# Patient Record
Sex: FEMALE | Race: BLACK OR AFRICAN AMERICAN | NOT HISPANIC OR LATINO | Employment: FULL TIME | URBAN - METROPOLITAN AREA
[De-identification: names, ages, dates, MRNs, and addresses within clinical notes are randomized per-mention and may not be internally consistent; named-entity substitution may affect disease eponyms.]

---

## 2017-01-26 RX ORDER — LISINOPRIL AND HYDROCHLOROTHIAZIDE 20; 25 MG/1; MG/1
TABLET ORAL
Qty: 30 TABLET | Refills: 0 | OUTPATIENT
Start: 2017-01-26

## 2017-03-15 RX ORDER — LISINOPRIL AND HYDROCHLOROTHIAZIDE 20; 25 MG/1; MG/1
TABLET ORAL
Qty: 30 TABLET | Refills: 0 | OUTPATIENT
Start: 2017-03-15

## 2017-03-29 RX ORDER — LISINOPRIL AND HYDROCHLOROTHIAZIDE 20; 25 MG/1; MG/1
TABLET ORAL
Qty: 30 TABLET | Refills: 0 | OUTPATIENT
Start: 2017-03-29

## 2017-05-02 ENCOUNTER — OFFICE VISIT (OUTPATIENT)
Dept: INTERNAL MEDICINE | Facility: CLINIC | Age: 52
End: 2017-05-02
Payer: COMMERCIAL

## 2017-05-02 VITALS
SYSTOLIC BLOOD PRESSURE: 154 MMHG | HEART RATE: 100 BPM | DIASTOLIC BLOOD PRESSURE: 120 MMHG | HEIGHT: 64 IN | TEMPERATURE: 97 F | BODY MASS INDEX: 32.7 KG/M2 | WEIGHT: 191.56 LBS

## 2017-05-02 DIAGNOSIS — E11.9 CONTROLLED TYPE 2 DIABETES MELLITUS WITHOUT COMPLICATION, WITHOUT LONG-TERM CURRENT USE OF INSULIN: ICD-10-CM

## 2017-05-02 DIAGNOSIS — Z12.31 SCREENING MAMMOGRAM FOR HIGH-RISK PATIENT: ICD-10-CM

## 2017-05-02 DIAGNOSIS — Z12.11 ENCOUNTER FOR SCREENING COLONOSCOPY: ICD-10-CM

## 2017-05-02 DIAGNOSIS — I10 ESSENTIAL HYPERTENSION: Primary | ICD-10-CM

## 2017-05-02 DIAGNOSIS — Z12.4 PAP SMEAR FOR CERVICAL CANCER SCREENING: ICD-10-CM

## 2017-05-02 PROCEDURE — 3080F DIAST BP >= 90 MM HG: CPT | Mod: S$GLB,,, | Performed by: FAMILY MEDICINE

## 2017-05-02 PROCEDURE — 3046F HEMOGLOBIN A1C LEVEL >9.0%: CPT | Mod: 8P,S$GLB,, | Performed by: FAMILY MEDICINE

## 2017-05-02 PROCEDURE — 99999 PR PBB SHADOW E&M-EST. PATIENT-LVL III: CPT | Mod: PBBFAC,,, | Performed by: FAMILY MEDICINE

## 2017-05-02 PROCEDURE — 4010F ACE/ARB THERAPY RXD/TAKEN: CPT | Mod: S$GLB,,, | Performed by: FAMILY MEDICINE

## 2017-05-02 PROCEDURE — 1160F RVW MEDS BY RX/DR IN RCRD: CPT | Mod: S$GLB,,, | Performed by: FAMILY MEDICINE

## 2017-05-02 PROCEDURE — 3077F SYST BP >= 140 MM HG: CPT | Mod: S$GLB,,, | Performed by: FAMILY MEDICINE

## 2017-05-02 PROCEDURE — 99214 OFFICE O/P EST MOD 30 MIN: CPT | Mod: S$GLB,,, | Performed by: FAMILY MEDICINE

## 2017-05-02 RX ORDER — LISINOPRIL AND HYDROCHLOROTHIAZIDE 20; 25 MG/1; MG/1
1 TABLET ORAL DAILY
Qty: 90 TABLET | Refills: 1 | Status: SHIPPED | OUTPATIENT
Start: 2017-05-02 | End: 2018-02-07 | Stop reason: SDUPTHER

## 2017-05-02 RX ORDER — METFORMIN HYDROCHLORIDE 500 MG/1
500 TABLET ORAL 2 TIMES DAILY WITH MEALS
Qty: 60 TABLET | Refills: 2 | Status: SHIPPED | OUTPATIENT
Start: 2017-05-02 | End: 2017-08-29 | Stop reason: SDUPTHER

## 2017-05-02 RX ORDER — NIFEDIPINE 90 MG/1
90 TABLET, EXTENDED RELEASE ORAL DAILY
Qty: 90 TABLET | Refills: 3 | Status: SHIPPED | OUTPATIENT
Start: 2017-05-02 | End: 2018-02-07 | Stop reason: SDUPTHER

## 2017-05-02 NOTE — MR AVS SNAPSHOT
[facility]  [address]  [place] [address]  Phone: [phone]  Fax: [phone]                  Kaitlyn Hairston   2017 1:00 PM   Office Visit    Description:  Female : 1965   Provider:  David Nunes MD   Department:  [facility] - Internal Medicine           Reason for Visit     Annual Exam     Medication Refill           Diagnoses this Visit        Comments    Essential hypertension    -  Primary Will stay 20/25 mg of lisinopril and procardia    Encounter for screening colonoscopy     Will colonoscopy.    Controlled type 2 diabetes mellitus without complication, without long-term current use of insulin     Will continue with the metformin 500 mg and discussed possible diet    Pap smear for cervical cancer screening     Will set pt up with GYN    Screening mammogram for high-risk patient     Will do mammogram           To Do List           Future Appointments        Provider Department Dept Phone    2017 1:00 PM Gladis Hoffmann NP [facility] - OB/ [phone]    2017 2:30 PM [facility] MAMM-SCR [facility] [phone]    2017 1:00 PM Luke Colon Jr., PAMargaritaC [facility] - Diabetes Management [phone]      Goals (5 Years of Data)     None      Follow-Up and Disposition     Return in about 6 months (around 2017).    Follow-up and Disposition History       These Medications        Disp Refills Start End    lisinopril-hydrochlorothiazide (PRINZIDE,ZESTORETIC) 20-25 mg Tab 90 tablet 1 2017    Take 1 tablet by mouth once daily. - Oral    Pharmacy: [facility] [address] [place], [place] [address] - [address] Ph #: [phone]       nifedipine (PROCARDIA-XL) 90 MG (OSM) 24 hr tablet 90 tablet 3 2017     Take 1 tablet (90 mg total) by mouth once daily. - Oral    Ph[facility] [facility] [address] [address] Ph #: [phone]       metformin (GLUCOPHAGE) 500 MG tablet 60 tablet 2 2017  5/2/2018    Take 1 tablet (500 mg total) by mouth 2 (two) times daily with meals. - Oral    Pharmacy: [facility] [address] [place] [address] [address] [place] [address] #: [phone]         [facility]     [facility] On Call Nurse Care Line - 24/7 Assistance  Unless otherwise directed by your provider, please contact [facility], our nurse care line that is available for 24/7 assistance.     Registered nurses in the [facility] provide: appointment scheduling, clinical advisement, health education, and other advisory services.  Call: [phone] (toll free)               Medications           Message regarding Medications     Verify the changes and/or additions to your medication regime listed below are the same as discussed with your clinician today.  If any of these changes or additions are incorrect, please notify your healthcare provider.        CHANGE how you are taking these medications     Start Taking Instead of    nifedipine (PROCARDIA-XL) 90 MG (OSM) 24 hr tablet nifedipine (PROCARDIA-XL) 90 MG (OSM) TR24    Dosage:  Take 1 tablet (90 mg total) by mouth once daily. Dosage:  Take 1 tablet (90 mg total) by mouth once daily.    Reason for Change:  Reorder            Verify that the below list of medications is an accurate representation of the medications you are currently taking.  If none reported, the list may be blank. If incorrect, please contact your healthcare provider. Carry this list with you in case of emergency.           Current Medications     lisinopril-hydrochlorothiazide (PRINZIDE,ZESTORETIC) 20-25 mg Tab Take 1 tablet by mouth once daily.    metformin (GLUCOPHAGE) 500 MG tablet Take 1 tablet (500 mg total) by mouth 2 (two) times daily with meals.    nifedipine (PROCARDIA-XL) 90 MG (OSM) 24 hr tablet Take 1 tablet (90 mg total) by mouth once daily.           Clinical Reference Information           Your Vitals Were     BP Pulse Temp Height Weight Last Period    154/120 (BP  "Location: Right arm, Patient Position: Sitting, BP Method: Manual) 100 97 °F (36.1 °C) (Tympanic) 5' 4" (1.626 m) 86.9 kg (191 lb 9.3 oz) 05/02/2017    BMI                32.88 kg/m2          Blood Pressure          Most Recent Value    BP  (!)  154/120      Allergies as of 5/2/2017     No Known Allergies      Immunizations Administered on Date of Encounter - 5/2/2017     None      Orders Placed During Today's Visit      Normal Orders This Visit    Ambulatory consult to Gynecology     Ambulatory Referral to Diabetes Education     Case request GI: COLONOSCOPY     Future Labs/Procedures Expected by Expires    Mammo Digital Screening Bilateral With CAD  5/2/2017 7/2/2018      [facility] Sign-Up     Activating your [facility] account is as easy as 1-2-3!     1) Visit [email or web address], select Sign Up Now, enter this activation code and your date of birth, then select Next.  [email or web address]  Expires: 6/16/2017  2:00 PM      2) Create a username and password to use when you visit [facility] in the future and select a security question in case you lose your password and select Next.    3) Enter your e-mail address and click Sign Up!    Additional Information  If you have questions, please e-mail [email or web address] or call [phone] to talk to our [facility] staff. Remember, [facility] is NOT to be used for urgent needs. For medical emergencies, dial 911.         Language Assistance Services     ATTENTION: Language assistance services are available, free of charge. Please call [phone].      ATENCIÓN: Si habla español, tiene a cohen disposición servicios gratuitos de asistencia lingüística. Llame al [phone].     CHÚ Ý: N?u b?n nói Ti?ng Vi?t, có các d?ch v? h? tr? ngôn ng? mi?n phí dành cho b?n. G?i s? [phone].         [facility] complies with applicable Federal civil rights laws and does not discriminate on the basis of race, color, national origin, age, disability, or sex.        "

## 2017-05-02 NOTE — PROGRESS NOTES
Subjective:       Patient ID: Kaitlyn Hairston is a 51 y.o. female.    Chief Complaint: Annual Exam and Medication Refill    Medication Refill   This is a chronic problem. The current episode started more than 1 year ago. The problem occurs constantly. Pertinent negatives include no arthralgias, change in bowel habit, chest pain, chills, headaches, joint swelling, nausea, neck pain, urinary symptoms or visual change. Nothing aggravates the symptoms. She has tried nothing for the symptoms. The treatment provided moderate relief.     Review of Systems   Constitutional: Negative.  Negative for chills.   Respiratory: Negative.    Cardiovascular: Negative for chest pain.   Gastrointestinal: Negative for change in bowel habit and nausea.   Genitourinary: Negative.    Musculoskeletal: Negative.  Negative for arthralgias, joint swelling and neck pain.   Neurological: Negative.  Negative for headaches.   Hematological: Negative.        Objective:      Physical Exam   Constitutional: She is oriented to person, place, and time. She appears well-developed and well-nourished.   Cardiovascular: Normal rate and regular rhythm.    Pulmonary/Chest: Effort normal and breath sounds normal.   Abdominal: Soft. Bowel sounds are normal.   Neurological: She is alert and oriented to person, place, and time.   Skin: Skin is warm and dry.       Assessment:       1. Essential hypertension    2. Encounter for screening colonoscopy    3. Controlled type 2 diabetes mellitus without complication, without long-term current use of insulin    4. Pap smear for cervical cancer screening    5. Screening mammogram for high-risk patient        Plan:       Essential hypertension  Comments:  Will stay 20/25 mg of lisinopril and procardia    Encounter for screening colonoscopy  Comments:  Will colonoscopy.  Orders:  -     Case request GI: COLONOSCOPY    Controlled type 2 diabetes mellitus without complication, without long-term current use of  insulin  Comments:  Will continue with the metformin 500 mg and discussed possible diet  Orders:  -     metformin (GLUCOPHAGE) 500 MG tablet; Take 1 tablet (500 mg total) by mouth 2 (two) times daily with meals.  Dispense: 60 tablet; Refill: 2  -     Ambulatory Referral to Diabetes Education    Pap smear for cervical cancer screening  Comments:  Will set pt up with GYN  Orders:  -     Ambulatory consult to Gynecology    Screening mammogram for high-risk patient  Comments:  Will do mammogram  Orders:  -     Mammo Digital Screening Bilateral With CAD; Future; Expected date: 5/2/17    Other orders  -     lisinopril-hydrochlorothiazide (PRINZIDE,ZESTORETIC) 20-25 mg Tab; Take 1 tablet by mouth once daily.  Dispense: 90 tablet; Refill: 1  -     nifedipine (PROCARDIA-XL) 90 MG (OSM) 24 hr tablet; Take 1 tablet (90 mg total) by mouth once daily.  Dispense: 90 tablet; Refill: 3

## 2017-05-05 DIAGNOSIS — E11.9 TYPE 2 DIABETES MELLITUS WITHOUT COMPLICATION: ICD-10-CM

## 2017-08-29 ENCOUNTER — OFFICE VISIT (OUTPATIENT)
Dept: INTERNAL MEDICINE | Facility: CLINIC | Age: 52
End: 2017-08-29
Payer: COMMERCIAL

## 2017-08-29 VITALS
WEIGHT: 194.25 LBS | DIASTOLIC BLOOD PRESSURE: 86 MMHG | HEIGHT: 64 IN | BODY MASS INDEX: 33.16 KG/M2 | TEMPERATURE: 96 F | HEART RATE: 92 BPM | SYSTOLIC BLOOD PRESSURE: 120 MMHG

## 2017-08-29 DIAGNOSIS — I10 ESSENTIAL HYPERTENSION: ICD-10-CM

## 2017-08-29 DIAGNOSIS — E11.9 CONTROLLED TYPE 2 DIABETES MELLITUS WITHOUT COMPLICATION, WITHOUT LONG-TERM CURRENT USE OF INSULIN: ICD-10-CM

## 2017-08-29 DIAGNOSIS — Z12.11 ENCOUNTER FOR SCREENING COLONOSCOPY: Primary | ICD-10-CM

## 2017-08-29 DIAGNOSIS — Z12.4 ENCOUNTER FOR PAPANICOLAOU SMEAR FOR CERVICAL CANCER SCREENING: ICD-10-CM

## 2017-08-29 DIAGNOSIS — Z12.31 ENCOUNTER FOR MAMMOGRAM TO ESTABLISH BASELINE MAMMOGRAM: ICD-10-CM

## 2017-08-29 PROCEDURE — 99213 OFFICE O/P EST LOW 20 MIN: CPT | Mod: S$GLB,,, | Performed by: FAMILY MEDICINE

## 2017-08-29 PROCEDURE — 3079F DIAST BP 80-89 MM HG: CPT | Mod: S$GLB,,, | Performed by: FAMILY MEDICINE

## 2017-08-29 PROCEDURE — 3074F SYST BP LT 130 MM HG: CPT | Mod: S$GLB,,, | Performed by: FAMILY MEDICINE

## 2017-08-29 PROCEDURE — 3008F BODY MASS INDEX DOCD: CPT | Mod: S$GLB,,, | Performed by: FAMILY MEDICINE

## 2017-08-29 PROCEDURE — 4010F ACE/ARB THERAPY RXD/TAKEN: CPT | Mod: S$GLB,,, | Performed by: FAMILY MEDICINE

## 2017-08-29 PROCEDURE — 99999 PR PBB SHADOW E&M-EST. PATIENT-LVL IV: CPT | Mod: PBBFAC,,, | Performed by: FAMILY MEDICINE

## 2017-08-29 RX ORDER — METFORMIN HYDROCHLORIDE 500 MG/1
500 TABLET ORAL 2 TIMES DAILY WITH MEALS
Qty: 60 TABLET | Refills: 5 | Status: SHIPPED | OUTPATIENT
Start: 2017-08-29 | End: 2018-02-07 | Stop reason: SDUPTHER

## 2017-08-29 NOTE — PROGRESS NOTES
Subjective:       Patient ID: Kaitlyn Hairston is a 52 y.o. female.    Chief Complaint: Follow-up    F/U:      Pt I sa 52 year old here for follow-up with HTN. Pt BP is better controlled today. She does have DM that is well controlled. Discussed with pt mammogram, colonoscopy and PAP.      Review of Systems   Constitutional: Negative.    Respiratory: Negative.    Cardiovascular: Negative.    Genitourinary: Negative.    Neurological: Negative.    Hematological: Negative.    Psychiatric/Behavioral: Negative.        Objective:      Physical Exam   Constitutional: She is oriented to person, place, and time. She appears well-developed and well-nourished.   Eyes: EOM are normal. Pupils are equal, round, and reactive to light.   Cardiovascular: Normal rate and regular rhythm.    Pulmonary/Chest: Effort normal and breath sounds normal. She has no wheezes. She exhibits no tenderness.   Abdominal: Soft. Bowel sounds are normal.   Neurological: She is alert and oriented to person, place, and time.   Psychiatric: She has a normal mood and affect.       Assessment:       1. Encounter for screening colonoscopy    2. Controlled type 2 diabetes mellitus without complication, without long-term current use of insulin    3. Encounter for mammogram to establish baseline mammogram    4. Encounter for Papanicolaou smear for cervical cancer screening    5. Essential hypertension        Plan:       Encounter for screening colonoscopy  -     Case request GI: COLONOSCOPY    Controlled type 2 diabetes mellitus without complication, without long-term current use of insulin  Comments:  Will continue with the metformin 500 mg and discussed possible diet  Orders:  -     metformin (GLUCOPHAGE) 500 MG tablet; Take 1 tablet (500 mg total) by mouth 2 (two) times daily with meals.  Dispense: 60 tablet; Refill: 5    Encounter for mammogram to establish baseline mammogram  -     Mammo Digital Screening Bilateral With CAD; Future; Expected date:  08/29/2017    Encounter for Papanicolaou smear for cervical cancer screening  -     Ambulatory consult to Gynecology    Essential hypertension

## 2017-09-12 ENCOUNTER — HOSPITAL ENCOUNTER (OUTPATIENT)
Dept: RADIOLOGY | Facility: HOSPITAL | Age: 52
Discharge: HOME | End: 2017-09-12
Attending: FAMILY MEDICINE
Payer: COMMERCIAL

## 2017-09-12 ENCOUNTER — OFFICE VISIT (OUTPATIENT)
Dept: OBSTETRICS AND GYNECOLOGY | Facility: CLINIC | Age: 52
End: 2017-09-12
Payer: COMMERCIAL

## 2017-09-12 VITALS
HEIGHT: 64 IN | WEIGHT: 197.06 LBS | SYSTOLIC BLOOD PRESSURE: 168 MMHG | DIASTOLIC BLOOD PRESSURE: 109 MMHG | BODY MASS INDEX: 33.64 KG/M2

## 2017-09-12 DIAGNOSIS — Z01.419 WELL WOMAN EXAM WITH ROUTINE GYNECOLOGICAL EXAM: Primary | ICD-10-CM

## 2017-09-12 DIAGNOSIS — I10 ESSENTIAL HYPERTENSION: ICD-10-CM

## 2017-09-12 DIAGNOSIS — Z12.31 ENCOUNTER FOR MAMMOGRAM TO ESTABLISH BASELINE MAMMOGRAM: ICD-10-CM

## 2017-09-12 PROCEDURE — 88175 CYTOPATH C/V AUTO FLUID REDO: CPT

## 2017-09-12 PROCEDURE — 77067 SCR MAMMO BI INCL CAD: CPT | Mod: TC

## 2017-09-12 PROCEDURE — 77067 SCR MAMMO BI INCL CAD: CPT | Mod: 26,,, | Performed by: RADIOLOGY

## 2017-09-12 PROCEDURE — 99999 PR PBB SHADOW E&M-EST. PATIENT-LVL III: CPT | Mod: PBBFAC,,, | Performed by: OBSTETRICS & GYNECOLOGY

## 2017-09-12 PROCEDURE — 99386 PREV VISIT NEW AGE 40-64: CPT | Mod: S$GLB,,, | Performed by: OBSTETRICS & GYNECOLOGY

## 2017-09-12 NOTE — LETTER
September 12, 2017      David Nunes MD  [address] [place] [address]           [facility]  [address] [place] [address]  Phone: [phone]  Fax: [phone]          Patient: Kaitlyn Hairston   MR Number: [id number]   YOB: 1965   Date of Visit: 9/12/2017       Dear Dr. David Nunes:    Thank you for referring Kaitlyn Hairston to me for evaluation. Attached you will find relevant portions of my assessment and plan of care.    If you have questions, please do not hesitate to call me. I look forward to following Kaitlyn Hairston along with you.    Sincerely,    Anoop Ramirez MD    Enclosure  CC:  No Recipients    If you would like to receive this communication electronically, please contact [email or web address] or [phone] to request more information on [employer] access.    For providers and/or their staff who would like to refer a patient to [facility], please contact us through our one-stop-shop provider referral line, [facility], at [phone].    If you feel you have received this communication in error or would no longer like to receive these types of communications, please e-mail [email or web address]

## 2017-09-12 NOTE — PROGRESS NOTES
Subjective:       Patient ID: Kaitlyn Hairston is a 52 y.o. female.    Chief Complaint:  Well Woman      History of Present Illness  HPI  Annual Exam-Premenopausal  Patient presents for annual exam. The patient has no complaints today. The patient is sexually active. GYN screening history: last pap: approximate date  and was normal and last mammogram: approximate date  and was normal. The patient wears seatbelts: yes. The patient participates in regular exercise: no. Has the patient ever been transfused or tattooed?: no. The patient reports that there is not domestic violence in her life.  Menses remain regular.  Denies excessive cramping or bleeding.  Pt admits to forgetting to take BP pill this morning.  Forgets to take meds from time to time.  Denies headache, CP.        GYN & OB HistoryPatient's last menstrual period was 2017 (exact date).   Date of Last Pap: No result found    OB History    Para Term  AB Living   3 3 3     3   SAB TAB Ectopic Multiple Live Births                  # Outcome Date GA Lbr Isac/2nd Weight Sex Delivery Anes PTL Lv   3 Term            2 Term            1 Term                   Review of Systems  Review of Systems   Constitutional: Negative for activity change, appetite change, fatigue, fever and unexpected weight change.   Respiratory: Negative for shortness of breath.    Cardiovascular: Negative for chest pain, palpitations and leg swelling.   Gastrointestinal: Negative for abdominal pain, constipation, diarrhea, nausea and vomiting.   Genitourinary: Negative for dyspareunia, dysuria, frequency, genital sores, hematuria, menorrhagia, menstrual problem, pelvic pain, vaginal bleeding, vaginal discharge, vaginal pain, dysmenorrhea and vaginal odor.   Musculoskeletal: Negative for back pain.   Neurological: Negative for syncope and headaches.   Breast: Negative for breast mass, breast pain, nipple discharge and skin changes          Objective:    Physical  Exam:   Constitutional: She is oriented to person, place, and time. She appears well-developed and well-nourished. No distress.    HENT:   Head: Normocephalic and atraumatic.    Eyes: EOM are normal. Pupils are equal, round, and reactive to light.    Neck: Normal range of motion. Neck supple.    Cardiovascular: Normal rate, regular rhythm and normal heart sounds.     Pulmonary/Chest: Effort normal and breath sounds normal. Right breast exhibits no inverted nipple, no mass, no nipple discharge, no skin change, no tenderness, no bleeding and no swelling. Left breast exhibits no inverted nipple, no mass, no nipple discharge, no skin change, no tenderness, no bleeding and no swelling. Breasts are symmetrical.        Abdominal: Soft. Bowel sounds are normal. She exhibits no distension. There is no tenderness.     Genitourinary: Vagina normal and uterus normal. Pelvic exam was performed with patient supine. There is no rash, tenderness, lesion or injury on the right labia. There is no rash, tenderness, lesion or injury on the left labia. Uterus is not deviated, not enlarged and not tender. Cervix is normal. Right adnexum displays no mass, no tenderness and no fullness. Left adnexum displays no mass, no tenderness and no fullness. No erythema, tenderness or bleeding in the vagina. No foreign body in the vagina. No signs of injury around the vagina. No vaginal discharge found. Cervix exhibits no motion tenderness, no discharge and no friability.           Musculoskeletal: Normal range of motion and moves all extremeties. She exhibits no edema or tenderness.       Neurological: She is alert and oriented to person, place, and time.    Skin: Skin is warm and dry.    Psychiatric: She has a normal mood and affect. Her behavior is normal. Thought content normal.        Vitals:    09/12/17 1610   BP: (!) 168/109          Assessment:        1. Well woman exam with routine gynecological exam    2. Essential hypertension              Plan:      Well woman exam with routine gynecological exam  -     Liquid-based pap smear, screening  -     Pt was counseled on cervical/vaginal screening guidelines and recommendations.  Last pap NILM on 2014.  If today's pap smear result is negative, next pap smear will be due in 2020.  -     Pt was advised on current breast cancer screening recommendations.  Pt desires to proceed with breast exam today and screening MMG.  -     Follow up with PCP for routine health maintenance needs.    Essential hypertension  -      BP today elevated and pt admits to forgetting to take medication today.  Pt was counseled on importance of taking medication as instructed and on risks of non-compliance.  Pt voiced understanding and vows to do better.         Return in about 1 year (around 9/12/2018).

## 2017-09-18 ENCOUNTER — TELEPHONE (OUTPATIENT)
Dept: OBSTETRICS AND GYNECOLOGY | Facility: CLINIC | Age: 52
End: 2017-09-18

## 2017-09-18 NOTE — TELEPHONE ENCOUNTER
----- Message from Anoop Ramirez MD sent at 9/18/2017  8:08 AM CDT -----  Please inform patient that her pap smear result is normal.

## 2018-02-07 ENCOUNTER — OFFICE VISIT (OUTPATIENT)
Dept: INTERNAL MEDICINE | Facility: CLINIC | Age: 53
End: 2018-02-07
Payer: COMMERCIAL

## 2018-02-07 VITALS
TEMPERATURE: 98 F | BODY MASS INDEX: 33.57 KG/M2 | HEIGHT: 64 IN | SYSTOLIC BLOOD PRESSURE: 134 MMHG | WEIGHT: 196.63 LBS | DIASTOLIC BLOOD PRESSURE: 88 MMHG | HEART RATE: 101 BPM

## 2018-02-07 DIAGNOSIS — B35.3 TINEA PEDIS OF RIGHT FOOT: ICD-10-CM

## 2018-02-07 DIAGNOSIS — E11.9 CONTROLLED TYPE 2 DIABETES MELLITUS WITHOUT COMPLICATION, WITHOUT LONG-TERM CURRENT USE OF INSULIN: ICD-10-CM

## 2018-02-07 DIAGNOSIS — Z12.11 ENCOUNTER FOR SCREENING COLONOSCOPY: ICD-10-CM

## 2018-02-07 DIAGNOSIS — I10 ESSENTIAL HYPERTENSION: Primary | ICD-10-CM

## 2018-02-07 PROBLEM — Z12.31 ENCOUNTER FOR MAMMOGRAM TO ESTABLISH BASELINE MAMMOGRAM: Status: RESOLVED | Noted: 2017-08-29 | Resolved: 2018-02-07

## 2018-02-07 PROBLEM — Z12.4 ENCOUNTER FOR PAPANICOLAOU SMEAR FOR CERVICAL CANCER SCREENING: Status: RESOLVED | Noted: 2017-08-29 | Resolved: 2018-02-07

## 2018-02-07 PROCEDURE — 99214 OFFICE O/P EST MOD 30 MIN: CPT | Mod: S$GLB,,, | Performed by: NURSE PRACTITIONER

## 2018-02-07 PROCEDURE — 99999 PR PBB SHADOW E&M-EST. PATIENT-LVL III: CPT | Mod: PBBFAC,,, | Performed by: NURSE PRACTITIONER

## 2018-02-07 PROCEDURE — 3008F BODY MASS INDEX DOCD: CPT | Mod: S$GLB,,, | Performed by: NURSE PRACTITIONER

## 2018-02-07 RX ORDER — LISINOPRIL AND HYDROCHLOROTHIAZIDE 20; 25 MG/1; MG/1
1 TABLET ORAL DAILY
Qty: 90 TABLET | Refills: 1 | Status: SHIPPED | OUTPATIENT
Start: 2018-02-07 | End: 2018-11-24 | Stop reason: SDUPTHER

## 2018-02-07 RX ORDER — METFORMIN HYDROCHLORIDE 500 MG/1
500 TABLET ORAL 2 TIMES DAILY WITH MEALS
Qty: 60 TABLET | Refills: 5 | Status: SHIPPED | OUTPATIENT
Start: 2018-02-07 | End: 2019-02-11

## 2018-02-07 RX ORDER — NYSTATIN 100000 U/G
OINTMENT TOPICAL 2 TIMES DAILY
Qty: 15 G | Refills: 0 | Status: SHIPPED | OUTPATIENT
Start: 2018-02-07 | End: 2018-05-09 | Stop reason: ALTCHOICE

## 2018-02-07 RX ORDER — NIFEDIPINE 90 MG/1
90 TABLET, EXTENDED RELEASE ORAL DAILY
Qty: 90 TABLET | Refills: 3 | Status: SHIPPED | OUTPATIENT
Start: 2018-02-07 | End: 2019-02-11 | Stop reason: SDUPTHER

## 2018-02-07 NOTE — PROGRESS NOTES
"Subjective:       Patient ID: Kaitlyn Hairston is a 52 y.o. female.    Chief Complaint: Medication Refill    HTN- pt is requesting refill on Prinzide and Procardia. States that BP runs well at home. No cp, sob, headache, dizziness, blurred vision, syncope, or leg swelling. Admits to not always eating low sodium. Will schedule eye exam and [facility] this month.    DM- admits to not checking regularly. Does not always take metformin as ordered. She does not think she really needs it. Denies numbness/tinging in extremities, polyuria -dypsia, or -phagia    Diet - difficulty with diet compliance. Reports sweets as a "weakness"    Darkened skin and itching between toes reported. Has been washing with peroxide.    Reports very active at work - walks up and down several flights of stairs all day. No exercise outside of work  No recent hospitalization       Review of Systems   Constitutional: Negative for activity change, appetite change, chills, diaphoresis, fatigue, fever and unexpected weight change.   HENT: Negative for congestion, ear pain, postnasal drip, rhinorrhea, sinus pain, sinus pressure, sneezing, sore throat, tinnitus, trouble swallowing and voice change.    Eyes: Negative for photophobia, pain and visual disturbance.   Respiratory: Negative for cough, chest tightness, shortness of breath and wheezing.    Cardiovascular: Negative for chest pain, palpitations and leg swelling.   Gastrointestinal: Negative for abdominal distention, abdominal pain, blood in stool, constipation, diarrhea, nausea and vomiting.   Endocrine: Negative for polydipsia, polyphagia and polyuria.   Genitourinary: Negative for dysuria and frequency.   Musculoskeletal: Negative for arthralgias, back pain, joint swelling, neck pain and neck stiffness.   Skin:        Darkened itchy skin b/t toes    Neurological: Negative for dizziness, tremors, seizures, syncope, facial asymmetry, speech difficulty, weakness, light-headedness, numbness and " headaches.   Psychiatric/Behavioral: Negative for confusion and sleep disturbance.       Objective:      Physical Exam   Constitutional: She is oriented to person, place, and time.   Neck: Normal range of motion. Neck supple.   Cardiovascular: Normal rate, regular rhythm and normal heart sounds.    Pulmonary/Chest: Effort normal and breath sounds normal.   Abdominal: Soft. Bowel sounds are normal.   Musculoskeletal: Normal range of motion.   Neurological: She is alert and oriented to person, place, and time.   Skin: Skin is warm and dry.   Right foot interdigital darkening and mild scaling noted. No sign of secondary bacterial infection noted.   Psychiatric: She has a normal mood and affect.       Assessment:       1. Essential hypertension    2. Controlled type 2 diabetes mellitus without complication, without long-term current use of insulin    3. Tinea pedis of right foot    4. Encounter for screening colonoscopy        Plan:   Essential hypertension  -     NIFEdipine (PROCARDIA-XL) 90 MG (OSM) 24 hr tablet; Take 1 tablet (90 mg total) by mouth once daily.  Dispense: 90 tablet; Refill: 3  -     lisinopril-hydrochlorothiazide (PRINZIDE,ZESTORETIC) 20-25 mg Tab; Take 1 tablet by mouth once daily.  Dispense: 90 tablet; Refill: 1  -     Lipid panel; Future; Expected date: 06/07/2018    Controlled type 2 diabetes mellitus without complication, without long-term current use of insulin  -     metFORMIN (GLUCOPHAGE) 500 MG tablet; Take 1 tablet (500 mg total) by mouth 2 (two) times daily with meals.  Dispense: 60 tablet; Refill: 5  -     Hemoglobin A1c; Future; Expected date: 02/07/2018  -     Lipid panel; Future; Expected date: 06/07/2018  -     Microalbumin/creatinine urine ratio; Future; Expected date: 02/07/2018    Tinea pedis of right foot  -     nystatin (MYCOSTATIN) ointment; Apply topically 2 (two) times daily.  Dispense: 15 g; Refill: 0    Encounter for screening colonoscopy  -     Case request GI:  COLONOSCOPY      Labs/urine as above  Refill meds  colonscopy screening  RTC 6 months

## 2018-02-13 ENCOUNTER — LAB VISIT (OUTPATIENT)
Dept: LAB | Facility: HOSPITAL | Age: 53
End: 2018-02-13
Attending: NURSE PRACTITIONER
Payer: COMMERCIAL

## 2018-02-13 DIAGNOSIS — E11.9 CONTROLLED TYPE 2 DIABETES MELLITUS WITHOUT COMPLICATION, WITHOUT LONG-TERM CURRENT USE OF INSULIN: ICD-10-CM

## 2018-02-13 DIAGNOSIS — I10 ESSENTIAL HYPERTENSION: ICD-10-CM

## 2018-02-13 LAB
CHOLEST SERPL-MCNC: 177 MG/DL
CHOLEST/HDLC SERPL: 2.9 {RATIO}
ESTIMATED AVG GLUCOSE: 131 MG/DL
HBA1C MFR BLD HPLC: 6.2 %
HDLC SERPL-MCNC: 62 MG/DL
HDLC SERPL: 35 %
LDLC SERPL CALC-MCNC: 101 MG/DL
NONHDLC SERPL-MCNC: 115 MG/DL
TRIGL SERPL-MCNC: 70 MG/DL

## 2018-02-13 PROCEDURE — 80061 LIPID PANEL: CPT

## 2018-02-13 PROCEDURE — 36415 COLL VENOUS BLD VENIPUNCTURE: CPT | Mod: PO

## 2018-02-13 PROCEDURE — 83036 HEMOGLOBIN GLYCOSYLATED A1C: CPT

## 2018-02-16 ENCOUNTER — DOCUMENTATION ONLY (OUTPATIENT)
Dept: ENDOSCOPY | Facility: HOSPITAL | Age: 53
End: 2018-02-16

## 2018-05-09 ENCOUNTER — OFFICE VISIT (OUTPATIENT)
Dept: INTERNAL MEDICINE | Facility: CLINIC | Age: 53
End: 2018-05-09
Payer: COMMERCIAL

## 2018-05-09 ENCOUNTER — LAB VISIT (OUTPATIENT)
Dept: LAB | Facility: HOSPITAL | Age: 53
End: 2018-05-09
Attending: FAMILY MEDICINE
Payer: COMMERCIAL

## 2018-05-09 VITALS
HEART RATE: 87 BPM | BODY MASS INDEX: 34.77 KG/M2 | HEIGHT: 64 IN | DIASTOLIC BLOOD PRESSURE: 80 MMHG | TEMPERATURE: 98 F | SYSTOLIC BLOOD PRESSURE: 132 MMHG | WEIGHT: 203.69 LBS

## 2018-05-09 DIAGNOSIS — I10 ESSENTIAL HYPERTENSION: ICD-10-CM

## 2018-05-09 DIAGNOSIS — M10.9 ACUTE GOUT INVOLVING TOE OF RIGHT FOOT, UNSPECIFIED CAUSE: Primary | ICD-10-CM

## 2018-05-09 DIAGNOSIS — E11.9 CONTROLLED TYPE 2 DIABETES MELLITUS WITHOUT COMPLICATION, WITHOUT LONG-TERM CURRENT USE OF INSULIN: ICD-10-CM

## 2018-05-09 DIAGNOSIS — M10.9 ACUTE GOUT INVOLVING TOE OF RIGHT FOOT, UNSPECIFIED CAUSE: ICD-10-CM

## 2018-05-09 LAB — URATE SERPL-MCNC: 8.1 MG/DL

## 2018-05-09 PROCEDURE — 99214 OFFICE O/P EST MOD 30 MIN: CPT | Mod: S$GLB,,, | Performed by: FAMILY MEDICINE

## 2018-05-09 PROCEDURE — 84550 ASSAY OF BLOOD/URIC ACID: CPT

## 2018-05-09 PROCEDURE — 99999 PR PBB SHADOW E&M-EST. PATIENT-LVL III: CPT | Mod: PBBFAC,,, | Performed by: FAMILY MEDICINE

## 2018-05-09 PROCEDURE — 36415 COLL VENOUS BLD VENIPUNCTURE: CPT | Mod: PO

## 2018-05-09 RX ORDER — COLCHICINE 0.6 MG/1
0.6 TABLET ORAL DAILY
Qty: 3 TABLET | Refills: 0 | Status: SHIPPED | OUTPATIENT
Start: 2018-05-09 | End: 2019-02-11

## 2018-05-09 RX ORDER — PREDNISONE 5 MG/1
TABLET ORAL
Qty: 20 TABLET | Refills: 0 | Status: SHIPPED | OUTPATIENT
Start: 2018-05-09 | End: 2018-10-26

## 2018-05-09 NOTE — PROGRESS NOTES
Subjective:       Patient ID: Kaitlyn Hairston is a 52 y.o. female.    Chief Complaint: Toe Pain    Pt is a 52 year old with 2 day history of right big toe pain and swelling.       Toe Pain    The incident occurred 2 days ago. The incident occurred at home. There was no injury mechanism. Pain location: right big toe. The pain is at a severity of 7/10. The pain is moderate. Pertinent negatives include no inability to bear weight or muscle weakness. Nothing aggravates the symptoms. She has tried nothing for the symptoms. The treatment provided moderate relief.     Review of Systems   Constitutional: Negative.    Respiratory: Negative.    Cardiovascular: Negative.    Gastrointestinal: Negative.    Genitourinary: Negative.    Neurological: Negative.    Hematological: Negative.        Objective:      Physical Exam   Constitutional: She is oriented to person, place, and time. She appears well-developed.   Cardiovascular: Normal rate and regular rhythm.  Exam reveals no friction rub.    No murmur heard.  Pulmonary/Chest: Effort normal and breath sounds normal. She has no wheezes. She has no rales.   Musculoskeletal:        Right ankle: She exhibits swelling.        Feet:    Neurological: She is alert and oriented to person, place, and time.   Skin: Skin is warm and dry.   Psychiatric: She has a normal mood and affect. Her behavior is normal.       Assessment:       1. Acute gout involving toe of right foot, unspecified cause    2. Controlled type 2 diabetes mellitus without complication, without long-term current use of insulin    3. Essential hypertension        Plan:       Acute gout involving toe of right foot, unspecified cause  Comments:  Will start pt on colchine .6 mg  Orders:  -     Uric acid; Future; Expected date: 05/09/2018    Controlled type 2 diabetes mellitus without complication, without long-term current use of insulin  Comments:  Watch sugars due to being on prednisone    Essential  hypertension  Comments:  BP has been well controlled since being on Lisinopril/hctz    Other orders  -     colchicine 0.6 mg tablet; Take 1 tablet (0.6 mg total) by mouth once daily.  Dispense: 3 tablet; Refill: 0  -     predniSONE (DELTASONE) 5 MG tablet; Day 1-2: take 1 tablet by oral route every 6 hour  Day 3-4: take 1 tablet by oral route every 8 hours  Day 5-6: take 1 tablet by oral route every 12 hour  Day 7-8: take 1 tablet by oral route in AM  Dispense: 20 tablet; Refill: 0

## 2018-05-10 DIAGNOSIS — M10.9 ACUTE GOUT INVOLVING TOE OF RIGHT FOOT, UNSPECIFIED CAUSE: Primary | ICD-10-CM

## 2018-05-10 RX ORDER — ALLOPURINOL 100 MG/1
200 TABLET ORAL DAILY
Qty: 180 TABLET | Refills: 1 | Status: SHIPPED | OUTPATIENT
Start: 2018-05-10

## 2018-06-19 ENCOUNTER — TELEPHONE (OUTPATIENT)
Dept: OPHTHALMOLOGY | Facility: CLINIC | Age: 53
End: 2018-06-19

## 2018-06-19 NOTE — TELEPHONE ENCOUNTER
Called pt per ophthalmology/dm call list and left vm stating pt is overdue for annual eye exam per Dr. Nunes's records. Left ophthalmology dept phone number for pt to call and schedule an appointment if she needs annual exam done

## 2018-06-27 ENCOUNTER — TELEPHONE (OUTPATIENT)
Dept: INTERNAL MEDICINE | Facility: CLINIC | Age: 53
End: 2018-06-27

## 2018-06-27 NOTE — TELEPHONE ENCOUNTER
----- Message from Katie Yost sent at 6/27/2018  4:33 PM CDT -----  Contact: Pt  Please give pt a call at [phone] regarding a question she needs to ask the nurse wouldn't give anymore info.

## 2018-07-25 ENCOUNTER — PATIENT OUTREACH (OUTPATIENT)
Dept: ADMINISTRATIVE | Facility: HOSPITAL | Age: 53
End: 2018-07-25

## 2018-07-25 NOTE — LETTER
July 25, 2018        Kaitlyn Hairston  [address] [address]      Dear Mrs. Hairston,    You have an upcoming appointment with David Nunes MD on 8/8/2018 @ 9:40 am.      Your chart is indicating you may be due for the following and I will be happy to assist you in scheduling any needed appointments:  Health Maintenance Due   Topic    Eye Exam     Colonoscopy     Foot Exam     Mammogram      If you have had any of the above done at another facility, please bring the records or information with you so that your record at [facility] will be complete.    We will be happy to assist you with scheduling any necessary appointments or you may contact the [facility] appointment desk at [phone] to schedule at your convenience.     Thank you for choosing [facility] for your healthcare needs,      AYAN Bingham  Care Coordination Department  [facility]  [phone]

## 2018-07-25 NOTE — PROGRESS NOTES
Health Maintenance reviewed.  Mammogram and colonoscopy referral pended. Pre visit chart audit letter sent.

## 2018-08-31 DIAGNOSIS — E11.9 TYPE 2 DIABETES MELLITUS WITHOUT COMPLICATION: ICD-10-CM

## 2018-10-26 ENCOUNTER — OFFICE VISIT (OUTPATIENT)
Dept: INTERNAL MEDICINE | Facility: CLINIC | Age: 53
End: 2018-10-26
Payer: COMMERCIAL

## 2018-10-26 VITALS
WEIGHT: 196.44 LBS | BODY MASS INDEX: 33.54 KG/M2 | OXYGEN SATURATION: 98 % | DIASTOLIC BLOOD PRESSURE: 94 MMHG | HEART RATE: 104 BPM | SYSTOLIC BLOOD PRESSURE: 148 MMHG | TEMPERATURE: 99 F | HEIGHT: 64 IN

## 2018-10-26 DIAGNOSIS — I10 ESSENTIAL HYPERTENSION: ICD-10-CM

## 2018-10-26 DIAGNOSIS — E11.9 TYPE 2 DIABETES MELLITUS WITHOUT COMPLICATION, WITHOUT LONG-TERM CURRENT USE OF INSULIN: ICD-10-CM

## 2018-10-26 DIAGNOSIS — J06.9 VIRAL URI WITH COUGH: Primary | ICD-10-CM

## 2018-10-26 PROCEDURE — 96372 THER/PROPH/DIAG INJ SC/IM: CPT | Mod: S$GLB,,, | Performed by: NURSE PRACTITIONER

## 2018-10-26 PROCEDURE — 99999 PR PBB SHADOW E&M-EST. PATIENT-LVL IV: CPT | Mod: PBBFAC,,, | Performed by: NURSE PRACTITIONER

## 2018-10-26 PROCEDURE — 99214 OFFICE O/P EST MOD 30 MIN: CPT | Mod: 25,S$GLB,, | Performed by: NURSE PRACTITIONER

## 2018-10-26 RX ORDER — PROMETHAZINE HYDROCHLORIDE AND DEXTROMETHORPHAN HYDROBROMIDE 6.25; 15 MG/5ML; MG/5ML
5 SYRUP ORAL NIGHTLY PRN
Qty: 118 ML | Refills: 0 | Status: SHIPPED | OUTPATIENT
Start: 2018-10-26 | End: 2018-11-05

## 2018-10-26 RX ORDER — BETAMETHASONE SODIUM PHOSPHATE AND BETAMETHASONE ACETATE 3; 3 MG/ML; MG/ML
9 INJECTION, SUSPENSION INTRA-ARTICULAR; INTRALESIONAL; INTRAMUSCULAR; SOFT TISSUE
Status: COMPLETED | OUTPATIENT
Start: 2018-10-26 | End: 2018-10-26

## 2018-10-26 RX ADMIN — BETAMETHASONE SODIUM PHOSPHATE AND BETAMETHASONE ACETATE 9 MG: 3; 3 INJECTION, SUSPENSION INTRA-ARTICULAR; INTRALESIONAL; INTRAMUSCULAR; SOFT TISSUE at 04:10

## 2018-10-26 NOTE — PROGRESS NOTES
Subjective:       Patient ID: Kaitlyn Hairston is a 53 y.o. female.    Chief Complaint: Generalized Body Aches; Sore Throat; Cough; and Hoarse    HPI    Above symptoms x 5-6 days. Taking alkaseltzer cold with minimal improvement. No abd pain, rash, n/v/d, fever.      Review of Systems   Constitutional: Negative for activity change, appetite change, chills, diaphoresis, fatigue, fever and unexpected weight change.   HENT: Positive for congestion, postnasal drip, rhinorrhea, sore throat and voice change. Negative for ear pain, sinus pressure, sinus pain, sneezing, tinnitus and trouble swallowing.    Eyes: Negative for photophobia, pain and visual disturbance.   Respiratory: Positive for cough. Negative for chest tightness, shortness of breath and wheezing.    Cardiovascular: Negative for chest pain, palpitations and leg swelling.   Gastrointestinal: Negative for abdominal distention, abdominal pain, constipation, diarrhea, nausea and vomiting.   Genitourinary: Negative for decreased urine volume, difficulty urinating, dysuria, flank pain, frequency, hematuria and urgency.   Musculoskeletal: Negative for arthralgias, back pain, joint swelling, neck pain and neck stiffness.   Allergic/Immunologic: Negative for immunocompromised state.   Neurological: Negative for dizziness, tremors, seizures, syncope, facial asymmetry, speech difficulty, weakness, light-headedness, numbness and headaches.   Hematological: Negative for adenopathy. Does not bruise/bleed easily.   Psychiatric/Behavioral: Negative for confusion and sleep disturbance.       Objective:      Physical Exam   Constitutional: She is oriented to person, place, and time.   HENT:   Right Ear: Tympanic membrane normal.   Left Ear: Tympanic membrane normal.   Nose: Mucosal edema and rhinorrhea present.   Mouth/Throat: Uvula is midline and mucous membranes are normal. Posterior oropharyngeal erythema present.   Neck: Normal range of motion. Neck supple.    Cardiovascular: Normal rate, regular rhythm, normal heart sounds and intact distal pulses.   Pulmonary/Chest: Effort normal and breath sounds normal.   Abdominal: Soft. Bowel sounds are normal.   Musculoskeletal: Normal range of motion.   Neurological: She is alert and oriented to person, place, and time.   Skin: Skin is warm and dry.   Psychiatric: She has a normal mood and affect.       Assessment:     Vitals:    10/26/18 1556   BP: (!) 148/94   Pulse: 104   Temp: 99.4 °F (37.4 °C)         1. Viral URI with cough    2. Essential hypertension    3. Type 2 diabetes mellitus without complication, without long-term current use of insulin        Plan:   Viral URI with cough  -     betamethasone acetate-betamethasone sodium phosphate injection 9 mg  -     promethazine-dextromethorphan (PROMETHAZINE-DM) 6.25-15 mg/5 mL Syrp; Take 5 mLs by mouth nightly as needed.  Dispense: 118 mL; Refill: 0    Essential hypertension    Type 2 diabetes mellitus without complication, without long-term current use of insulin        Home care  · If symptoms are severe, rest at home for the first 2 to 3 days. When you resume activity, don't let yourself get too tired.  · Avoid being exposed to cigarette smoke (yours or others).  · You may use acetaminophen or ibuprofen to control pain and fever, unless another medicine was prescribed. (Note: If you have chronic liver or kidney disease, have ever had a stomach ulcer or gastrointestinal bleeding, or are taking blood-thinning medicines, talk with your healthcare provider before using these medicines.) Aspirin should never be given to anyone under 18 years of age who is ill with a viral infection or fever. It may cause severe liver or brain damage.  · Your appetite may be poor, so a light diet is fine. Avoid dehydration by drinking 6 to 8 glasses of fluids per day (water, soft drinks, juices, tea, or soup). Extra fluids will help loosen secretions in the nose and lungs.  · Over-the-counter  cold medicines will not shorten the length of time youre sick, but they may be helpful for the following symptoms: cough, sore throat, and nasal and sinus congestion. (Note: Do not use decongestants if you have high blood pressure.)  Follow-up care  Follow up with your healthcare provider, or as advised.  When to seek medical advice  Call your healthcare provider right away if any of these occur:  · Cough with lots of colored sputum (mucus)  · Severe headache; face, neck, or ear pain  · Difficulty swallowing due to throat pain  · Fever of 100.4°F (38°C)  Call 911, or get immediate medical care  Call emergency services right away if any of these occur:  · Chest pain, shortness of breath, wheezing, or difficulty breathing  · Coughing up blood  · Inability to swallow due to throat pain  Monitor BG and BP  Notify me by next week if no better

## 2018-10-26 NOTE — PATIENT INSTRUCTIONS
Viral Upper Respiratory Illness (Adult)  You have a viral upper respiratory illness (URI), which is another term for the common cold. This illness is contagious during the first few days. It is spread through the air by coughing and sneezing. It may also be spread by direct contact (touching the sick person and then touching your own eyes, nose, or mouth). Frequent handwashing will decrease risk of spread. Most viral illnesses go away within 7 to 10 days with rest and simple home remedies. Sometimes the illness may last for several weeks. Antibiotics will not kill a virus, and they are generally not prescribed for this condition.    Home care  · If symptoms are severe, rest at home for the first 2 to 3 days. When you resume activity, don't let yourself get too tired.  · Avoid being exposed to cigarette smoke (yours or others).  · You may use acetaminophen or ibuprofen to control pain and fever, unless another medicine was prescribed. (Note: If you have chronic liver or kidney disease, have ever had a stomach ulcer or gastrointestinal bleeding, or are taking blood-thinning medicines, talk with your healthcare provider before using these medicines.) Aspirin should never be given to anyone under 18 years of age who is ill with a viral infection or fever. It may cause severe liver or brain damage.  · Your appetite may be poor, so a light diet is fine. Avoid dehydration by drinking 6 to 8 glasses of fluids per day (water, soft drinks, juices, tea, or soup). Extra fluids will help loosen secretions in the nose and lungs.  · Over-the-counter cold medicines will not shorten the length of time youre sick, but they may be helpful for the following symptoms: cough, sore throat, and nasal and sinus congestion. (Note: Do not use decongestants if you have high blood pressure.)  Follow-up care  Follow up with your healthcare provider, or as advised.  When to seek medical advice  Call your healthcare provider right away if any  of these occur:  · Cough with lots of colored sputum (mucus)  · Severe headache; face, neck, or ear pain  · Difficulty swallowing due to throat pain  · Fever of 100.4°F (38°C)  Call 911, or get immediate medical care  Call emergency services right away if any of these occur:  · Chest pain, shortness of breath, wheezing, or difficulty breathing  · Coughing up blood  · Inability to swallow due to throat pain  Date Last Reviewed: 9/13/2015  © 4033-2115 [employer]. [address] [place] [address]. All rights reserved. This information is not intended as a substitute for professional medical care. Always follow your healthcare professional's instructions.

## 2018-11-13 DIAGNOSIS — I10 ESSENTIAL HYPERTENSION: ICD-10-CM

## 2018-11-14 RX ORDER — LISINOPRIL AND HYDROCHLOROTHIAZIDE 20; 25 MG/1; MG/1
TABLET ORAL
Qty: 90 TABLET | Refills: 1 | OUTPATIENT
Start: 2018-11-14

## 2018-11-24 ENCOUNTER — NURSE TRIAGE (OUTPATIENT)
Dept: ADMINISTRATIVE | Facility: CLINIC | Age: 53
End: 2018-11-24

## 2018-11-24 DIAGNOSIS — I10 ESSENTIAL HYPERTENSION: ICD-10-CM

## 2018-11-24 NOTE — TELEPHONE ENCOUNTER
"    Reason for Disposition   [1] Request for URGENT new prescription or refill of "essential" medication (i.e., likelihood of harm to patient if not taken) AND [2] triager unable to fill per unit policy    Answer Assessment - Initial Assessment Questions  1. SYMPTOMS: "Do you have any symptoms?"      denies  2. SEVERITY: If symptoms are present, ask "Are they mild, moderate or severe?"      Maintenance for B/P    Protocols used: ST MEDICATION QUESTION CALL-A-  paged Dr. Mariaa pan    "

## 2018-11-24 NOTE — TELEPHONE ENCOUNTER
Left message on recorder at [facility] indicated in epic. for refil of lisinopril.   Ok'd by Sabrina Abreu x 1month

## 2018-11-27 NOTE — TELEPHONE ENCOUNTER
Left message informing patient that she will need a follow up visit and Blood pressure monitored

## 2018-11-28 RX ORDER — LISINOPRIL AND HYDROCHLOROTHIAZIDE 20; 25 MG/1; MG/1
TABLET ORAL
Qty: 90 TABLET | Refills: 1 | Status: SHIPPED | OUTPATIENT
Start: 2018-11-28 | End: 2019-02-11

## 2018-12-21 DIAGNOSIS — N18.9 CHRONIC KIDNEY DISEASE, UNSPECIFIED CKD STAGE: ICD-10-CM

## 2019-02-11 ENCOUNTER — OFFICE VISIT (OUTPATIENT)
Dept: INTERNAL MEDICINE | Facility: CLINIC | Age: 54
End: 2019-02-11
Payer: COMMERCIAL

## 2019-02-11 VITALS
HEART RATE: 89 BPM | HEIGHT: 64 IN | SYSTOLIC BLOOD PRESSURE: 194 MMHG | DIASTOLIC BLOOD PRESSURE: 126 MMHG | TEMPERATURE: 98 F | WEIGHT: 198.44 LBS | BODY MASS INDEX: 33.88 KG/M2

## 2019-02-11 DIAGNOSIS — I10 ESSENTIAL HYPERTENSION: Primary | ICD-10-CM

## 2019-02-11 DIAGNOSIS — E11.9 CONTROLLED TYPE 2 DIABETES MELLITUS WITHOUT COMPLICATION, WITHOUT LONG-TERM CURRENT USE OF INSULIN: ICD-10-CM

## 2019-02-11 DIAGNOSIS — Z12.11 ENCOUNTER FOR SCREENING COLONOSCOPY: ICD-10-CM

## 2019-02-11 DIAGNOSIS — Z12.31 VISIT FOR SCREENING MAMMOGRAM: ICD-10-CM

## 2019-02-11 PROCEDURE — 99214 OFFICE O/P EST MOD 30 MIN: CPT | Mod: S$GLB,,, | Performed by: FAMILY MEDICINE

## 2019-02-11 PROCEDURE — 99214 PR OFFICE/OUTPT VISIT, EST, LEVL IV, 30-39 MIN: ICD-10-PCS | Mod: S$GLB,,, | Performed by: FAMILY MEDICINE

## 2019-02-11 PROCEDURE — 99999 PR PBB SHADOW E&M-EST. PATIENT-LVL III: ICD-10-PCS | Mod: PBBFAC,,, | Performed by: FAMILY MEDICINE

## 2019-02-11 PROCEDURE — 99999 PR PBB SHADOW E&M-EST. PATIENT-LVL III: CPT | Mod: PBBFAC,,, | Performed by: FAMILY MEDICINE

## 2019-02-11 RX ORDER — OLMESARTAN MEDOXOMIL AND HYDROCHLOROTHIAZIDE 40/25 40; 25 MG/1; MG/1
1 TABLET ORAL DAILY
Qty: 90 TABLET | Refills: 3 | Status: SHIPPED | OUTPATIENT
Start: 2019-02-11 | End: 2019-02-15

## 2019-02-11 RX ORDER — NIFEDIPINE 90 MG/1
90 TABLET, EXTENDED RELEASE ORAL DAILY
Qty: 90 TABLET | Refills: 3 | Status: SHIPPED | OUTPATIENT
Start: 2019-02-11 | End: 2019-02-19 | Stop reason: SDUPTHER

## 2019-02-11 NOTE — PROGRESS NOTES
Subjective:       Patient ID: Kaitlyn Hairston is a 53 y.o. female.    Chief Complaint: Follow-up    F/U:      Pt is a 53 year old who has been off BP meds for about 4 days. She also has DM that last A1C was controlled at 6.4      Review of Systems   Constitutional: Negative.    Respiratory: Negative.    Cardiovascular: Negative.    Genitourinary: Negative.    Musculoskeletal: Negative.    Neurological: Negative.    Hematological: Negative.    Psychiatric/Behavioral: Negative.        Objective:      Physical Exam   Constitutional: She is oriented to person, place, and time. She appears well-developed and well-nourished.   Cardiovascular: Normal rate and regular rhythm. Exam reveals no friction rub.   No murmur heard.  Pulmonary/Chest: Breath sounds normal. No stridor. No respiratory distress. She has no wheezes.   Abdominal: Soft. Bowel sounds are normal. There is no tenderness. There is no guarding.   Neurological: She is alert and oriented to person, place, and time.   Skin: Skin is warm and dry.   Psychiatric: She has a normal mood and affect. Her behavior is normal.       Assessment:       1. Essential hypertension    2. Controlled type 2 diabetes mellitus without complication, without long-term current use of insulin        Plan:       Essential hypertension  Comments:  Switched to benicar HCTZ 40/25  Orders:  -     NIFEdipine (PROCARDIA-XL) 90 MG (OSM) 24 hr tablet; Take 1 tablet (90 mg total) by mouth once daily.  Dispense: 90 tablet; Refill: 3  -     Comprehensive metabolic panel; Future; Expected date: 02/11/2019    Controlled type 2 diabetes mellitus without complication, without long-term current use of insulin  Comments:  Will recheck her Hga1C and microalbumin  Orders:  -     Microalbumin/creatinine urine ratio; Future; Expected date: 02/11/2019  -     Hemoglobin A1c; Future; Expected date: 02/11/2019    Other orders  -     olmesartan-hydrochlorothiazide (BENICAR HCT) 40-25 mg per tablet; Take 1  tablet by mouth once daily.  Dispense: 90 tablet; Refill: 3

## 2019-02-12 ENCOUNTER — TELEPHONE (OUTPATIENT)
Dept: INTERNAL MEDICINE | Facility: CLINIC | Age: 54
End: 2019-02-12

## 2019-02-14 ENCOUNTER — TELEPHONE (OUTPATIENT)
Dept: INTERNAL MEDICINE | Facility: CLINIC | Age: 54
End: 2019-02-14

## 2019-02-15 DIAGNOSIS — E11.9 TYPE 2 DIABETES MELLITUS WITHOUT COMPLICATION: ICD-10-CM

## 2019-02-15 RX ORDER — LOSARTAN POTASSIUM AND HYDROCHLOROTHIAZIDE 25; 100 MG/1; MG/1
1 TABLET ORAL DAILY
Qty: 90 TABLET | Refills: 3 | Status: SHIPPED | OUTPATIENT
Start: 2019-02-15 | End: 2020-02-15

## 2019-02-19 DIAGNOSIS — I10 ESSENTIAL HYPERTENSION: ICD-10-CM

## 2019-02-20 ENCOUNTER — TELEPHONE (OUTPATIENT)
Dept: ENDOSCOPY | Facility: HOSPITAL | Age: 54
End: 2019-02-20

## 2019-02-20 RX ORDER — NIFEDIPINE 90 MG/1
90 TABLET, EXTENDED RELEASE ORAL DAILY
Qty: 90 TABLET | Refills: 3 | Status: SHIPPED | OUTPATIENT
Start: 2019-02-20

## 2019-07-19 ENCOUNTER — PATIENT OUTREACH (OUTPATIENT)
Dept: ADMINISTRATIVE | Facility: HOSPITAL | Age: 54
End: 2019-07-19

## 2019-07-22 ENCOUNTER — PATIENT OUTREACH (OUTPATIENT)
Dept: ADMINISTRATIVE | Facility: HOSPITAL | Age: 54
End: 2019-07-22

## 2019-08-05 ENCOUNTER — PATIENT OUTREACH (OUTPATIENT)
Dept: ADMINISTRATIVE | Facility: HOSPITAL | Age: 54
End: 2019-08-05

## 2019-08-23 ENCOUNTER — TELEPHONE (OUTPATIENT)
Dept: INTERNAL MEDICINE | Facility: CLINIC | Age: 54
End: 2019-08-23

## 2019-08-23 NOTE — TELEPHONE ENCOUNTER
Working HTN report. Pt did not answer. Left a message for pt to call and schedule an appointment with Dr. David Nunes for hypertension.

## 2019-08-29 ENCOUNTER — PATIENT OUTREACH (OUTPATIENT)
Dept: ADMINISTRATIVE | Facility: HOSPITAL | Age: 54
End: 2019-08-29

## 2019-09-06 DIAGNOSIS — N18.9 CHRONIC KIDNEY DISEASE, UNSPECIFIED CKD STAGE: ICD-10-CM

## 2019-09-18 LAB — A1C EXTERNAL: 11.2

## 2019-10-09 ENCOUNTER — PATIENT OUTREACH (OUTPATIENT)
Dept: ADMINISTRATIVE | Facility: HOSPITAL | Age: 54
End: 2019-10-09

## 2019-10-18 ENCOUNTER — PATIENT OUTREACH (OUTPATIENT)
Dept: ADMINISTRATIVE | Facility: HOSPITAL | Age: 54
End: 2019-10-18

## 2019-10-18 LAB
ALBUMIN/GLOB SERPL ELPH: 1.1 {RATIO}
ALBUMIN: 4.3
ALP ISOS SERPL LEV INH-CCNC: 147 U/L
ALT: 20
AST: 25
BILIRUBIN, TOTAL: 0.4
BUN BLD-MCNC: 21 MG/DL (ref 4–21)
BUN/CREAT RATIO: 12
CALCIUM SERPL-MCNC: 9.5 MG/DL
CHLORIDE: 104
CO2 TOTAL: 17
CREAT SERPL-MCNC: 1.8 MG/DL
EGFR IF AFRICAN AMERICAN: 37
EST. GFR  (NON AFRICAN AMERICAN): 32 MG/DL
GLOBULIN SER CALC-MCNC: 3.9 G/L
GLUCOSE: 161
POTASSIUM: 4.5
PROT SERPL-MCNC: 8.2 G/DL
SODIUM: 141

## 2019-11-07 DIAGNOSIS — Z12.11 COLON CANCER SCREENING: ICD-10-CM

## 2019-12-02 ENCOUNTER — PATIENT OUTREACH (OUTPATIENT)
Dept: ADMINISTRATIVE | Facility: HOSPITAL | Age: 54
End: 2019-12-02

## 2019-12-06 ENCOUNTER — PATIENT OUTREACH (OUTPATIENT)
Dept: ADMINISTRATIVE | Facility: HOSPITAL | Age: 54
End: 2019-12-06

## 2019-12-06 DIAGNOSIS — N18.9 CHRONIC KIDNEY DISEASE, UNSPECIFIED CKD STAGE: ICD-10-CM

## 2019-12-10 ENCOUNTER — PATIENT OUTREACH (OUTPATIENT)
Dept: ADMINISTRATIVE | Facility: HOSPITAL | Age: 54
End: 2019-12-10

## 2019-12-10 NOTE — PROGRESS NOTES
CMP entered from [facility]. A1C(9/18/19) [facility] into Health Maintenance [facility]

## 2019-12-17 ENCOUNTER — PATIENT OUTREACH (OUTPATIENT)
Dept: ADMINISTRATIVE | Facility: HOSPITAL | Age: 54
End: 2019-12-17

## 2020-01-24 ENCOUNTER — TELEPHONE (OUTPATIENT)
Dept: INTERNAL MEDICINE | Facility: CLINIC | Age: 55
End: 2020-01-24

## 2020-08-07 DIAGNOSIS — N18.9 CHRONIC KIDNEY DISEASE, UNSPECIFIED CKD STAGE: ICD-10-CM

## 2020-08-27 ENCOUNTER — PATIENT OUTREACH (OUTPATIENT)
Dept: ADMINISTRATIVE | Facility: HOSPITAL | Age: 55
End: 2020-08-27

## 2020-08-27 NOTE — PROGRESS NOTES
Contacted patient about overdue Mammogram and colonoscopy Left a message requesting a call back.

## 2020-09-03 ENCOUNTER — PATIENT OUTREACH (OUTPATIENT)
Dept: ADMINISTRATIVE | Facility: HOSPITAL | Age: 55
End: 2020-09-03

## 2020-09-03 NOTE — LETTER
AUTHORIZATION FOR RELEASE OF   CONFIDENTIAL INFORMATION    Hello,    We are seeing Kaitlyn Hairston, date of birth 1965, in the clinic at [facility]. David Nunes MD is the patient's PCP. Kaitlyn Hairston has an outstanding lab/procedure at the time we reviewed her chart. In order to help keep her health information updated, she has authorized us to request the following medical record(s):        (  )  MAMMOGRAM                                      (  )  COLONOSCOPY      (  )  PAP SMEAR                                          (  )  OUTSIDE LAB RESULTS     (  )  DEXA SCAN                                          ( X )  EYE EXAM            (  )  FOOT EXAM                                          (  )  ENTIRE RECORD     (  )  OUTSIDE IMMUNIZATIONS                 (  )  _______________         Please fax records to [facility], Timothy T Duke, MD, [phone]     If you have any questions, please contact Sole Coffey at [phone].           Patient Name: Kaitlyn Hairston  : 1965  Patient Phone #: [phone]

## 2020-09-03 NOTE — PROGRESS NOTES
Contacted patient about overdue DM Eye Exam. Patient states she completed her eye exam at [facility]    Report requested

## 2020-09-04 DIAGNOSIS — Z12.39 BREAST CANCER SCREENING: ICD-10-CM

## 2020-10-20 ENCOUNTER — PATIENT OUTREACH (OUTPATIENT)
Dept: ADMINISTRATIVE | Facility: HOSPITAL | Age: 55
End: 2020-10-20

## 2020-10-20 NOTE — PROGRESS NOTES
Mammogram Report. Patient notified of overdue mammogram. Appointment has been scheduled for mammogram on 11/05/2020 and annual with PCP on 10/23/2020.

## 2021-04-09 ENCOUNTER — TELEPHONE (OUTPATIENT)
Dept: INTERNAL MEDICINE | Facility: CLINIC | Age: 56
End: 2021-04-09

## 2023-05-17 ENCOUNTER — TELEPHONE (OUTPATIENT)
Dept: TRANSPLANT | Facility: CLINIC | Age: 58
End: 2023-05-17
Payer: COMMERCIAL

## 2023-05-22 ENCOUNTER — TELEPHONE (OUTPATIENT)
Dept: TRANSPLANT | Facility: CLINIC | Age: 58
End: 2023-05-22
Payer: COMMERCIAL

## 2023-06-01 ENCOUNTER — TELEPHONE (OUTPATIENT)
Dept: TRANSPLANT | Facility: CLINIC | Age: 58
End: 2023-06-01
Payer: COMMERCIAL

## 2023-06-07 ENCOUNTER — TELEPHONE (OUTPATIENT)
Dept: TRANSPLANT | Facility: CLINIC | Age: 58
End: 2023-06-07
Payer: COMMERCIAL

## 2023-06-30 ENCOUNTER — TELEPHONE (OUTPATIENT)
Dept: TRANSPLANT | Facility: CLINIC | Age: 58
End: 2023-06-30
Payer: COMMERCIAL

## 2023-06-30 NOTE — TELEPHONE ENCOUNTER
----- Message from Yasmin Galarza sent at 6/30/2023 11:54 AM CDT -----  Regarding: Returning Missed Call  Contact: Pt  Pt is calling back from a missed call.   Pt. Would like a call back.              Confirmed contact below:   Contact Name:Kaitlyn Hairston  Phone Number: [phone] :

## 2023-07-05 ENCOUNTER — TELEPHONE (OUTPATIENT)
Dept: TRANSPLANT | Facility: CLINIC | Age: 58
End: 2023-07-05
Payer: COMMERCIAL

## 2023-07-05 DIAGNOSIS — Z91.89 CARDIOVASCULAR EVENT RISK: ICD-10-CM

## 2023-07-05 DIAGNOSIS — Z76.82 ORGAN TRANSPLANT CANDIDATE: Primary | ICD-10-CM

## 2023-08-28 ENCOUNTER — TELEPHONE (OUTPATIENT)
Dept: TRANSPLANT | Facility: CLINIC | Age: 58
End: 2023-08-28
Payer: COMMERCIAL

## 2023-09-05 ENCOUNTER — TELEPHONE (OUTPATIENT)
Dept: TRANSPLANT | Facility: CLINIC | Age: 58
End: 2023-09-05
Payer: COMMERCIAL

## 2023-09-06 ENCOUNTER — TELEPHONE (OUTPATIENT)
Dept: TRANSPLANT | Facility: CLINIC | Age: 58
End: 2023-09-06
Payer: COMMERCIAL

## 2023-09-06 NOTE — TELEPHONE ENCOUNTER
MA notes per Nephrologist Vance Archuleta     FOR THE PAST THREE MONTHS:    0-Appt Adhrence  0-No show    No concerns with labs, care giving, transportation, or mental health    Scanned in pt's media     Renata Perez  Abdominal Transplant MA

## 2023-10-18 DIAGNOSIS — Z76.82 ORGAN TRANSPLANT CANDIDATE: Primary | ICD-10-CM

## 2023-10-19 ENCOUNTER — TELEPHONE (OUTPATIENT)
Dept: TRANSPLANT | Facility: CLINIC | Age: 58
End: 2023-10-19
Payer: COMMERCIAL

## 2023-10-19 NOTE — TELEPHONE ENCOUNTER
----- Message from Michelle Abadie, RN sent at 10/19/2023  1:48 PM CDT -----  Regarding: FW: Reschedule Existing Appointment  Contact: Kaitlyn Hairston    ----- Message -----  From: Josiane Ayers  Sent: 10/19/2023   1:21 PM CDT  To: Michelle Abadie, RN  Subject: FW: Reschedule Existing Appointment                ----- Message -----  From: Marbella Longoria  Sent: 10/19/2023  11:15 AM CDT  To: [facility] Pre-Kidney Transplant Non-Clinical  Subject: Reschedule Existing Appointment                  Reschedule Existing Appointment    Current appt date: 10/19/2023    Type of appt :  initial pre kid      Reason for rescheduling: Patient calling to reschedule appt due to no caregiver available and transportation issues. Requesting a call back to reschedule.    Caller: Kaitlyn Hairston     Contact Preference: [phone] (Mobile    or     [phone] (Home Phone

## 2023-11-21 ENCOUNTER — TELEPHONE (OUTPATIENT)
Dept: TRANSPLANT | Facility: CLINIC | Age: 58
End: 2023-11-21
Payer: MEDICAID

## 2023-11-27 ENCOUNTER — TELEPHONE (OUTPATIENT)
Dept: TRANSPLANT | Facility: CLINIC | Age: 58
End: 2023-11-27
Payer: MEDICAID

## 2023-11-27 NOTE — TELEPHONE ENCOUNTER
MA notes per Pre Dialysis adherence form     FOR THE PAST THREE MONTHS:    0-Appt Adhrence  0-No show    No concerns with labs, care giving, transportation, or mental health    Scanned in pt's media     Renata Perez  Abdominal Transplant MA

## 2023-12-12 DIAGNOSIS — Z76.82 ORGAN TRANSPLANT CANDIDATE: Primary | ICD-10-CM

## 2023-12-28 DIAGNOSIS — Z91.89 CARDIOVASCULAR EVENT RISK: Primary | ICD-10-CM

## 2023-12-28 DIAGNOSIS — Z76.82 ORGAN TRANSPLANT CANDIDATE: ICD-10-CM

## 2024-02-01 ENCOUNTER — TELEPHONE (OUTPATIENT)
Dept: TRANSPLANT | Facility: CLINIC | Age: 59
End: 2024-02-01
Payer: COMMERCIAL

## 2024-05-15 ENCOUNTER — TELEPHONE (OUTPATIENT)
Dept: TRANSPLANT | Facility: CLINIC | Age: 59
End: 2024-05-15
Payer: COMMERCIAL

## 2024-05-15 DIAGNOSIS — Z76.82 ORGAN TRANSPLANT CANDIDATE: Primary | ICD-10-CM

## 2024-05-15 DIAGNOSIS — Z91.89 CARDIOVASCULAR EVENT RISK: ICD-10-CM

## 2024-05-15 NOTE — TELEPHONE ENCOUNTER
"----- Message from Oswald Tena sent at 5/15/2024 12:07 PM CDT -----  Regarding: miss call  Consult/Advisory:        Name Of Caller: Self        Contact Preference?:[phone]        What is the nature of the call?: Returning call to  Norma,        Additional Notes:  "Thank you for all that you do for our patients"  "

## 2024-06-14 ENCOUNTER — TELEPHONE (OUTPATIENT)
Dept: TRANSPLANT | Facility: CLINIC | Age: 59
End: 2024-06-14
Payer: COMMERCIAL

## 2024-06-20 ENCOUNTER — HOSPITAL ENCOUNTER (OUTPATIENT)
Dept: RADIOLOGY | Facility: HOSPITAL | Age: 59
Discharge: HOME | End: 2024-06-20
Payer: COMMERCIAL

## 2024-06-20 ENCOUNTER — OFFICE VISIT (OUTPATIENT)
Dept: TRANSPLANT | Facility: CLINIC | Age: 59
End: 2024-06-20
Payer: COMMERCIAL

## 2024-06-20 VITALS
OXYGEN SATURATION: 100 % | DIASTOLIC BLOOD PRESSURE: 94 MMHG | HEIGHT: 63 IN | SYSTOLIC BLOOD PRESSURE: 160 MMHG | HEART RATE: 74 BPM | WEIGHT: 182.75 LBS | BODY MASS INDEX: 32.38 KG/M2 | TEMPERATURE: 97 F | RESPIRATION RATE: 18 BRPM

## 2024-06-20 DIAGNOSIS — Z76.82 ORGAN TRANSPLANT CANDIDATE: ICD-10-CM

## 2024-06-20 DIAGNOSIS — N18.5 TYPE 2 DIABETES MELLITUS WITH STAGE 5 CHRONIC KIDNEY DISEASE NOT ON CHRONIC DIALYSIS, WITHOUT LONG-TERM CURRENT USE OF INSULIN: ICD-10-CM

## 2024-06-20 DIAGNOSIS — E11.22 TYPE 2 DIABETES MELLITUS WITH STAGE 5 CHRONIC KIDNEY DISEASE NOT ON CHRONIC DIALYSIS, WITHOUT LONG-TERM CURRENT USE OF INSULIN: ICD-10-CM

## 2024-06-20 DIAGNOSIS — I10 PRIMARY HYPERTENSION: ICD-10-CM

## 2024-06-20 DIAGNOSIS — N18.5 CKD (CHRONIC KIDNEY DISEASE), STAGE V: ICD-10-CM

## 2024-06-20 DIAGNOSIS — Z01.818 PRE-TRANSPLANT EVALUATION FOR KIDNEY TRANSPLANT: Primary | ICD-10-CM

## 2024-06-20 DIAGNOSIS — N25.81 SECONDARY HYPERPARATHYROIDISM: ICD-10-CM

## 2024-06-20 PROBLEM — G40.909 SEIZURE DISORDER: Status: ACTIVE | Noted: 2021-08-14

## 2024-06-20 PROCEDURE — 72170 X-RAY EXAM OF PELVIS: CPT | Mod: TC,TXP

## 2024-06-20 PROCEDURE — 3008F BODY MASS INDEX DOCD: CPT | Mod: CPTII,S$GLB,TXP, | Performed by: NURSE PRACTITIONER

## 2024-06-20 PROCEDURE — 3080F DIAST BP >= 90 MM HG: CPT | Mod: CPTII,S$GLB,TXP, | Performed by: NURSE PRACTITIONER

## 2024-06-20 PROCEDURE — 71046 X-RAY EXAM CHEST 2 VIEWS: CPT | Mod: TC,TXP

## 2024-06-20 PROCEDURE — 93978 VASCULAR STUDY: CPT | Mod: TC,TXP

## 2024-06-20 PROCEDURE — 99999 PR PBB SHADOW E&M-EST. PATIENT-LVL V: CPT | Mod: PBBFAC,TXP,, | Performed by: NURSE PRACTITIONER

## 2024-06-20 PROCEDURE — 99205 OFFICE O/P NEW HI 60 MIN: CPT | Mod: S$GLB,TXP,, | Performed by: NURSE PRACTITIONER

## 2024-06-20 PROCEDURE — 76770 US EXAM ABDO BACK WALL COMP: CPT | Mod: TC,TXP

## 2024-06-20 PROCEDURE — 99204 OFFICE O/P NEW MOD 45 MIN: CPT | Mod: S$GLB,TXP,, | Performed by: NURSE PRACTITIONER

## 2024-06-20 PROCEDURE — 1160F RVW MEDS BY RX/DR IN RCRD: CPT | Mod: CPTII,S$GLB,TXP, | Performed by: NURSE PRACTITIONER

## 2024-06-20 PROCEDURE — 99204 OFFICE O/P NEW MOD 45 MIN: CPT | Mod: S$GLB,TXP,, | Performed by: TRANSPLANT SURGERY

## 2024-06-20 PROCEDURE — 3077F SYST BP >= 140 MM HG: CPT | Mod: CPTII,S$GLB,TXP, | Performed by: NURSE PRACTITIONER

## 2024-06-20 PROCEDURE — 1159F MED LIST DOCD IN RCRD: CPT | Mod: CPTII,S$GLB,TXP, | Performed by: NURSE PRACTITIONER

## 2024-06-20 PROCEDURE — 76700 US EXAM ABDOM COMPLETE: CPT | Mod: TC,TXP

## 2024-06-20 PROCEDURE — 3044F HG A1C LEVEL LT 7.0%: CPT | Mod: CPTII,S$GLB,TXP, | Performed by: NURSE PRACTITIONER

## 2024-06-20 RX ORDER — FLUTICASONE PROPIONATE 50 MCG
1 SPRAY, SUSPENSION (ML) NASAL DAILY PRN
COMMUNITY
Start: 2024-03-11 | End: 2025-03-11

## 2024-06-20 RX ORDER — CARVEDILOL 25 MG/1
25 TABLET ORAL 2 TIMES DAILY
COMMUNITY
Start: 2023-06-27

## 2024-06-20 RX ORDER — ATORVASTATIN CALCIUM 40 MG/1
40 TABLET, FILM COATED ORAL DAILY
COMMUNITY

## 2024-06-20 RX ORDER — LEVETIRACETAM 500 MG/1
500 TABLET ORAL 2 TIMES DAILY
COMMUNITY

## 2024-06-20 RX ORDER — NIFEDIPINE 90 MG/1
1 TABLET, EXTENDED RELEASE ORAL EVERY MORNING
COMMUNITY
Start: 2024-01-02 | End: 2024-06-20

## 2024-06-20 RX ORDER — DEXTROMETHORPHAN HYDROBROMIDE, GUAIFENESIN 5; 100 MG/5ML; MG/5ML
650 LIQUID ORAL EVERY 8 HOURS PRN
COMMUNITY

## 2024-06-20 NOTE — PROGRESS NOTES
Transplant Surgery  Kidney Transplant Recipient Evaluation    Referring Physician: Cary Camarillo  Current Nephrologist: Vance Tang    Subjective:     Reason for Visit: evaluate transplant candidacy    History of Present Illness: Kaitlyn Hairston is a 58 y.o. year old female undergoing transplant evaluation.    Dialysis History: Kaitlyn is pre-dialysis.      Transplant History: N/A    Etiology of Renal Disease: Diabetes Mellitus - Type II (based on medical records from referral).    External provider notes reviewed: Yes    Review of Systems   Constitutional:  Negative for activity change, appetite change, chills, fatigue and fever.   HENT:  Negative for sore throat and trouble swallowing.    Eyes:  Negative for visual disturbance.   Respiratory:  Negative for cough, chest tightness and shortness of breath.    Cardiovascular:  Negative for chest pain, palpitations and leg swelling.   Gastrointestinal:  Negative for abdominal distention, abdominal pain, blood in stool, constipation, diarrhea, nausea and vomiting.   Endocrine: Negative for polyuria.   Genitourinary:  Negative for decreased urine volume, difficulty urinating, dysuria, flank pain, frequency and hematuria.   Musculoskeletal:  Negative for gait problem, myalgias and neck stiffness.   Skin:  Negative for rash and wound.   Neurological:  Negative for dizziness, tremors, seizures, weakness, light-headedness and headaches.   Hematological:  Negative for adenopathy.   Psychiatric/Behavioral:  Negative for agitation, confusion and sleep disturbance.      Objective:     Physical Exam:  Constitutional:   Vitals reviewed: yes   Well-nourished and well-groomed: yes  Eyes:   Sclerae icteric: no   Extraocular movements intact: yes  GI:    Bowel sounds normal: yes   Tenderness: no    If yes, quadrant/location: not applicable   Palpable masses: no    If yes, quadrant/location: not applicable   Hepatosplenomegaly: no   Ascites: no   Hernia: no    If yes,  type/location: not applicable   Surgical scars: yes    If yes, type/location: laparoscopic port sites  Resp:   Effort normal: yes   Breath sounds normal: yes    CV:   Regular rate and rhythm: yes   Heart sounds normal: yes   Femoral pulses normal: yes   Extremities edematous: no  Skin:   Rashes or lesions present: no    If yes, describe:not applicable   Jaundice:: no    Musculoskeletal:   Gait normal: yes   Strength normal: yes  Psych:   Oriented to person, place, and time: yes   Affect and mood normal: yes    Additional comments: not applicable    Diagnostics:  The following labs have been reviewed: CBC  CMP  PT  INR  PTT  UA  PTH  A1c  Lipid panel  ABO  The following radiology images have been independently reviewed and interpreted: pending    Counseling: We provided Kaitlyn Hairston with a group education session today.  We discussed kidney transplantation at length with her, including risks, potential complications, and alternatives in the management of her renal failure.  The discussion included complications related to anesthesia, bleeding, infection, primary nonfunction, and ATN.  I discussed the typical postoperative course, length of hospitalization, the need for long-term immunosuppression, and the need for long-term routine follow-up.  I discussed living-donor and -donor transplantation and the relative advantages and disadvantages of each.  I also discussed average waiting times for both living donation and  donation.  I discussed national and center-specific survival rates.  I also mentioned the potential benefit of multicenter listing to candidates listed with centers within more than one organ procurement organization.  All questions were answered.    Patient advised that it is recommended that all transplant candidates, and their close contacts and household members receive Covid vaccination.    Final determination of transplant candidacy will be made once evaluation is complete  and reviewed by the Kidney & Kidney/Pancreas Selection Committee.    Coronavirus disease (COVID-19) caused by severe acute respiratory virus coronavirus 2 (SARS-C0V 2) is associated with increased mortality in solid organ transplant recipients (SOT) compared to non-transplant patients. Vaccine responses to vaccination are depressed against SARS-CoV2 compared to normal individuals but improve with third vaccination doses. Vaccination prior to SOT provides both the best opportunity for transplant candidates to develop protective immunity and to reduce the risk of serious COVID19 infections post transplantation. Organ transplant candidates at [facility] will be required to receive SARS-CoV-2 vaccination prior to being listed with a an active status, whenever possible. Exceptions will be made for disability related reasons or for sincerely held [employer] beliefs.          Transplant Surgery - Candidacy   Assessment/Plan:   Kaitlyn Hairston is pre-dialysis with CKD stage 4 (GFR 15-29 mL/min). I see no surgical contraindication to placing a kidney transplant. Based on available information, Kaitlyn Hairston is a suitable kidney transplant candidate.     Additional testing to be completed according to the Written Order Guidelines for Adult Pre-kidney and Pancreas Transplant Evaluation (KI-02).  Interpretation of tests and discussion of patient management involves all members of the multidisciplinary transplant team.    Sade Enriquez MD

## 2024-06-20 NOTE — PROGRESS NOTES
PRE-TRANSPLANT INFECTIOUS DISEASE CONSULT    Reason for Visit:  Pre-transplant evaluation  Referring Provider: Dr. Vance Tang     History of Present Illness:    58 y.o. female with a history of CKD 2/2 HTN who presents for pre-kidney transplant evaluation.    Infectious History:  Recent hospital admissions: No  Recent infections: Yes  Recent vaginal infection/discharge.  Treated. Unclear with agent.  Recent or current antibiotic use: No  History of recurrent infections *(sinus / pneumonia / UTI / SBP)*: No  History of diabetic foot wound or bone/joint infection: No  Recent dental infections, issues or procedures: No.  Needs to have dental work  History of chicken pox: Yes  History of shingles: Yes.  Last episode about a year ago.   History of STI: Yes.  Trichomonas???  Unsure.  Reports treated  History of COVID infection: No    History of Immunosuppression:  Prior chemotherapy / immunosuppression: No  Prior transplant: No  History of splenectomy: No    Tuberculosis:  Prior screening for latent TB: Yes  Prior diagnosis of latent TB: No  Risk factors for TB *known exposure, incarceration, homelessness*: No    Geographical exposures:  Currently lives in [place] with daughter and grandchildren 19, 17, 5, and 3   Lived in the following states: [place]  Lived or travelled to the [facility] [place]: No  International travel: Yes.  [employer] cruise.    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  No  Tobacco: No  Alcohol: No  Recreational drug use:  No  Sexual partners: Not currently active     Past Histories:   Past Medical History:   Diagnosis Date    Carotid artery stenosis     CVA (cerebral vascular accident)     Diabetes mellitus, type 2     Disorder of kidney and ureter     DM (diabetes mellitus)  2015    HTN (hypertension) 2015    Hyperparathyroidism, unspecified     Normocytic anemia      Past Surgical History:   Procedure Laterality Date    CARPAL TUNNEL RELEASE Right      SECTION      CHOLECYSTECTOMY       Family History   Problem Relation Name Age of Onset    Stroke Maternal Aunt      Hypertension Maternal Grandmother       Social History     Tobacco Use    Smoking status: Never    Smokeless tobacco: Never   Substance Use Topics    Alcohol use: No    Drug use: No     Review of patient's allergies indicates:  No Known Allergies      Current antibiotics:  Antibiotics (From admission, onward)      None          Review of Systems   Constitutional: Negative for chills, fever and night sweats.   HENT:  Negative for congestion.    Respiratory:  Negative for cough, hemoptysis and sputum production.    Hematologic/Lymphatic: Negative for adenopathy.   Skin:  Positive for dry skin. Negative for poor wound healing, rash and suspicious lesions.   Musculoskeletal:  Negative for joint swelling.   Gastrointestinal:  Negative for diarrhea.   Genitourinary:  Negative for dysuria.   Allergic/Immunologic: Negative for persistent infections.   All other systems reviewed and are negative.         Objective  Physical Exam  Vitals reviewed.   Constitutional:       General: She is not in acute distress.     Appearance: Normal appearance. She is not ill-appearing.   HENT:      Head: Normocephalic and atraumatic.      Mouth/Throat:      Mouth: Mucous membranes are moist.   Eyes:      General: No scleral icterus.     Conjunctiva/sclera: Conjunctivae normal.   Cardiovascular:      Rate and Rhythm: Normal rate.   Pulmonary:      Effort: Pulmonary effort is normal. No respiratory distress.   Musculoskeletal:      Cervical back: Normal range of motion.      Right lower leg: Edema present.      Left lower leg: Edema present.   Skin:     General: Skin is warm and dry.      Findings: No rash.   Neurological:       "Mental Status: She is alert and oriented to person, place, and time.   Psychiatric:         Mood and Affect: Mood normal.         Behavior: Behavior normal.         Thought Content: Thought content normal.         Judgment: Judgment normal.           Labs:    CBC:   Lab Results   Component Value Date    WBC 8.10 06/20/2024    HGB 8.8 (L) 06/20/2024    HCT 28.0 (L) 06/20/2024    MCV 95 06/20/2024     06/20/2024    GRAN 3.6 06/20/2024    GRAN 45.0 06/20/2024    LYMPH 3.3 06/20/2024    LYMPH 40.7 06/20/2024    MONO 0.8 06/20/2024    MONO 10.4 06/20/2024    EOSINOPHIL 3.3 06/20/2024       Syphilis screening:   Lab Results   Component Value Date    TREPABIGMIGG Nonreactive 06/20/2024        TB screening: No results found for: "TBGOLDPLUS", "TSPOTSCREN"    HIV screening:   Lab Results   Component Value Date    EBT45DPRN Non-reactive 06/20/2024       Strongyloides IgG: No results found for: "STRONGANTIGG"    Hepatitis Serologies:   Lab Results   Component Value Date    HEPAIGG Non-reactive 06/20/2024    HEPBCAB Non-reactive 06/20/2024    HEPBSAB <3.00 06/20/2024    HEPBSAB Non-reactive 06/20/2024    HEPCAB Non-reactive 06/20/2024        Varicella IgG:   Lab Results   Component Value Date    VARICELLAINT Positive 06/20/2024         Immunization History   Administered Date(s) Administered    COVID-19, MRNA, LN-S, PF (Pfizer) (Purple Cap) 10/23/2021, 11/13/2021    Pneumococcal Polysaccharide - 23 Valent 09/03/2021    Zoster Recombinant 11/03/2022   Usual childhood vaccines   Flu -denies.  Never takes   COVID - Primary series.  No booster   Hep A - denies   Hep B - denies   PNA ppsv23 2021  TDAP - denies  Shingles - one dose only     Immunization History:  Received all childhood vaccines: Yes  All household members receive annual flu vaccine: No  All household members are up to date on COVID vaccine: No    Assessment and Plan    1. Risks of Infection: Available serologies were reviewed. No unusual risks of infection or " significant barriers to transplantation were identified from the infectious disease standpoint given the information available at this time.    - Acute infectious issues: None   - Pending serologies: Quantiferon gold / T-spot and Strongyloides IgG   - Please call if any pending serologic testing is positive.    2. Immunizations:  Based on the patient's immunization history and serologies, the following immunizations are recommended:  - Hepatitis A    Patient does not have immunity to hepatitis A    Vaccination ordered today: Yes   - Hepatitis B    Patient does not have immunity to hepatitis B    Vaccination ordered today: Yes   - COVID    Current CDC vaccination recommendations were discussed with the patient   - Annual high dose influenza     Vaccination ordered today: Yes   - Prevnar 20    Vaccination ordered today: Yes   - Tdap    Vaccination ordered today: Yes   - Shingrix    Vaccination ordered today: Yes    Recommended Pre-Transplant Immunization Schedule   Vaccine  0m 1m 2m 6m   Pneumococcal conjugate vaccine (Prevnar 20) X      Tetanus-diphtheria-pertussis (Tdap)* X      Hepatitis A Vaccine (Havrix)** X   X   Hepatitis B Vaccine (Heplisav)** X X     Influenza (annual) X      Zoster Recombinant Vaccine (Shingrix) X  X           *Administer booster every 10 years.       **Administer if no immunity demonstrated on serologies               Patient will receive vaccines at local pharmacy. A written prescription was provided for all vaccine doses.     3. Counseling:   I discussed with the patient and her daughter the risk for increased susceptibility to infections following transplantation including increased risk for infection right after transplant and if rejection should occur.  The patient has been counseled on the importance of vaccinations to decrease risk of infection and severe illness. Specific guidance has been provided to the patient regarding the patient's occupation, hobbies and activities to avoid  future infectious complications.     4. Transplant Candidacy: Based on available information, there are no identified significant barriers to transplantation from an infectious disease standpoint.  Final determination of transplant candidacy will be made once evaluation is complete and reviewed by the Selection Committee.      Follow up with infectious disease as needed.       The total time for evaluation and management services performed on 06/20/2024 was greater than 45 minutes.

## 2024-06-20 NOTE — LETTER
June 21, 2024        Vance Tang  [address] [place] [address]  Phone: [phone]  Fax: [phone]             [facility]  [address]  [place] [address]  Phone: [phone]   Patient: Kaitlyn Hairston   MR Number: [id number]   YOB: 1965   Date of Visit: 6/20/2024       Dear Dr. Vance Tang    Thank you for referring Kaitlyn Hairston to me for evaluation. Attached you will find relevant portions of my assessment and plan of care.    If you have questions, please do not hesitate to call me. I look forward to following Kaitlyn Hairston along with you.    Sincerely,    Sakshi Wadsworth, NP    Enclosure    If you would like to receive this communication electronically, please contact [email or web address] or [phone] to request [employer] access.    [employer] is a tool which provides read-only access to select patient information with whom you have a relationship. Its easy to use and provides real time access to review your patients record including encounter summaries, notes, results, and demographic information.    If you feel you have received this communication in error or would no longer like to receive these types of communications, please e-mail [email or web address]

## 2024-06-20 NOTE — PROGRESS NOTES
Transplant Nephrology  Kidney/Pancreas Transplant Recipient Evaluation    Referring Physician: Cary Camarillo  Current Nephrologist: Vance Tang    Subjective:   CC:  Initial evaluation of kidney transplant candidacy.    HPI:  Ms. Hairston is a 58 y.o. year old Black or  female who has presented to be evaluated as a potential kidney/pancreas transplant recipient.  She has advanced kidney disease secondary to presumed diabetic nephropathy and HTN, no renal biopsy.  Patient is currently pre-dialysis, does not have a dialysis access.     Has not seen general nephrology in 1 year. She had referral placed for transplant evaluation last year but no showed or cancelled 4 times due to transportation/caregiver issues. She is asking for second opinion whether she should have a dialysis placed.     HTN since 1980s. DM2 since 2016. She has been off insulin for a year. Denies retinopathy, neuropathy, or gastroparesis. A1c today 5.5.    CVA in 2021 with no residual deficits. She remains on plavix. Had one seizure during hospitalization for which she remains on keppra. Has not followed up with neurologist.     Works at a plant 7 days at a time, very active walking around going up/down stairs. Looks good, not frail. She has no potential donors at this time.     Denies MI, DVT/PE, or malignancy history.     Current Outpatient Medications   Medication Sig Dispense Refill    acetaminophen (TYLENOL) 650 MG TbSR Take 650 mg by mouth every 8 (eight) hours as needed.      atorvastatin (LIPITOR) 40 MG tablet Take 40 mg by mouth once daily.      carvediloL (COREG) 25 MG tablet Take 25 mg by mouth 2 (two) times daily.      fluticasone propionate (FLONASE) 50 mcg/actuation nasal spray 1 spray by Each Nostril route daily as needed for Allergies or Rhinitis.      levETIRAcetam (KEPPRA) 500 MG Tab Take 500 mg by mouth 2 (two) times daily.      NIFEdipine (PROCARDIA-XL) 90 MG (OSM) 24 hr tablet Take 1 tablet (90  mg total) by mouth once daily. 90 tablet 3     No current facility-administered medications for this visit.       Past Medical History:   Diagnosis Date    Carotid artery stenosis     CVA (cerebral vascular accident)     Diabetes mellitus, type 2     Disorder of kidney and ureter     DM (diabetes mellitus) 2015    HTN (hypertension) 2015    Hyperparathyroidism, unspecified     Normocytic anemia        Past Medical and Surgical History: Ms. Hairston  has a past medical history of Carotid artery stenosis, CVA (cerebral vascular accident), Diabetes mellitus, type 2, Disorder of kidney and ureter, DM (diabetes mellitus), HTN (hypertension), Hyperparathyroidism, unspecified, and Normocytic anemia.  She has a past surgical history that includes  section; Carpal tunnel release (Right); and Cholecystectomy.    Past Social and Family History: Ms. Hairston reports that she has never smoked. She has never used smokeless tobacco. She reports that she does not drink alcohol and does not use drugs. Her family history includes Hypertension in her maternal grandmother; Stroke in her maternal aunt.    Review of Systems   Constitutional:  Positive for fatigue. Negative for activity change and fever.   Eyes:  Negative for visual disturbance.   Respiratory:  Negative for shortness of breath.    Cardiovascular:  Negative for chest pain and leg swelling.   Gastrointestinal:  Negative for constipation, diarrhea and nausea.   Genitourinary:  Negative for difficulty urinating, frequency and hematuria.   Musculoskeletal:  Negative for arthralgias and myalgias.   Skin:  Negative for wound.   Neurological:  Negative for weakness and numbness.   Hematological:  Bruises/bleeds easily.        Plavix (CVA)   Psychiatric/Behavioral:  Negative for sleep disturbance. The patient is not nervous/anxious.        Objective:   Blood pressure (!) 160/94, pulse 74, temperature 97.3 °F (36.3 °C), temperature source Temporal, resp. rate  "18, height 5' 2.76" (1.594 m), weight 82.9 kg (182 lb 12.2 oz), SpO2 100%.body mass index is 32.63 kg/m².    Physical Exam  Vitals and nursing note reviewed.   Constitutional:       Appearance: Normal appearance.   Cardiovascular:      Rate and Rhythm: Normal rate and regular rhythm.      Heart sounds: Normal heart sounds.   Pulmonary:      Effort: Pulmonary effort is normal.      Breath sounds: Normal breath sounds.   Abdominal:      General: There is no distension.   Musculoskeletal:         General: Normal range of motion.      Right lower leg: Edema present.      Left lower leg: Edema present.   Skin:     General: Skin is warm and dry.   Neurological:      Mental Status: She is alert.         Labs:  Lab Results   Component Value Date    WBC 8.10 06/20/2024    HGB 8.8 (L) 06/20/2024    HCT 28.0 (L) 06/20/2024     06/20/2024    K 3.8 06/20/2024     (H) 06/20/2024    CO2 15 (L) 06/20/2024    BUN 54 (H) 06/20/2024    CREATININE 4.3 (H) 06/20/2024    EGFRNORACEVR 11.4 (A) 06/20/2024    CALCIUM 8.6 (L) 06/20/2024    PHOS 5.5 (H) 06/20/2024    ALBUMIN 3.7 06/20/2024    AST 23 06/20/2024    ALT 14 06/20/2024    PTH 1,943.7 (H) 06/20/2024       Lab Results   Component Value Date    LIPASE 71 02/10/2021       Labs were reviewed with the patient.    Assessment:     1. Pre-transplant evaluation for kidney transplant    2. CKD (chronic kidney disease), stage V    3. Type 2 diabetes mellitus with stage 5 chronic kidney disease not on chronic dialysis, without long-term current use of insulin    4. Primary hypertension    5. Secondary hyperparathyroidism    6. BMI 32.0-32.9,adult      Plan:   58 y.o. year old  female who has presented to be evaluated as a potential kidney/pancreas transplant recipient.  She has advanced kidney disease secondary to presumed diabetic nephropathy and HTN, no renal biopsy.  Patient is currently pre-dialysis. She will need afternoon imaging, cardiac testing, pap, and " colonoscopy prior to selection. Stressed the importance of closer follow up with general nephrology to monitor her kidney disease, also for PTH management (nearly 2000 today). Recommended seeing neurology to discuss need for keppra. She understands that she does not meet pancreas criteria (BMI>30, no insulin) and will be considered for kidney alone.       Transplant Candidacy:   Based on available information, Ms. Hairston is a suitable kidney transplant candidate.   Meets center eligibility for accepting HCV+ donor offer - Yes.  Patient educated on HCV+ donors. Kaitlyn is willing to accept HCV+ donor offer - No (PT DECLINES)  Patient is a candidate for KDPI > 85 kidney donor offer - Yes.  Final determination of transplant candidacy will be made once workup is complete and reviewed by the selection committee.    Patient advised that it is recommended that all transplant candidates, and their close contacts and household members receive Covid vaccination.    UNOS Patient Status  Functional Status: 90% - Able to carry on normal activity: minor symptoms of disease    Sakshi Wadsworth NP

## 2024-06-20 NOTE — PROGRESS NOTES
PHARM.D. PRE-TRANSPLANT NOTE:    This patient's medication therapy was evaluated as part of her pre-transplant evaluation.      The following general pharmacologic concerns were noted:   Levetiracetam to be re-started post-op  Patient reports previous seizure episode    The following concerns for post-operative pain management were noted: none    The following pharmacologic concerns related to HCV therapy were noted: Atorvastatin interacts with DAA therapy therefore should be transitioned to rosuvastatin prior to initiating (max: 10 mg)     This patient's medication profile was reviewed for considerations for DAA Hepatitis C therapy:    [x]  No current inducers of CYP 3A4 or PGP  [x]  No amiodarone on this patient's EMR profile in the last 24 months  [x]  No past or current atrial fibrillation on this patient's EMR profile       Current Outpatient Medications   Medication Sig Dispense Refill    acetaminophen (TYLENOL) 650 MG TbSR Take 650 mg by mouth every 8 (eight) hours as needed.      atorvastatin (LIPITOR) 40 MG tablet Take 40 mg by mouth once daily.      carvediloL (COREG) 25 MG tablet Take 25 mg by mouth 2 (two) times daily.      fluticasone propionate (FLONASE) 50 mcg/actuation nasal spray 1 spray by Each Nostril route daily as needed for Allergies or Rhinitis.      levETIRAcetam (KEPPRA) 500 MG Tab Take 500 mg by mouth 2 (two) times daily.      NIFEdipine (PROCARDIA-XL) 90 MG (OSM) 24 hr tablet Take 1 tablet (90 mg total) by mouth once daily. 90 tablet 3     No current facility-administered medications for this visit.     I am available for consultation and can be contacted, as needed by the other members of the Transplant team.

## 2024-06-20 NOTE — PROGRESS NOTES
INITIAL PATIENT EDUCATION NOTE AA    Ms. Kaitlyn Hairston was seen in pre-kidney transplant clinic for evaluation for kidney, kidney/pancreas or pancreas only transplant.  The patient attended an individual video education session that discussed/reviewed the following aspects of transplantation: evaluation including diagnostic and laboratory testing,(Chemistries, Hematology, Serologies including HIV and Hepatitis and HLA) required for transplantation and selection committee process, UNOS waitlist management/multiple listings, types of organs offered (KDPI < 85%, KDPI > 85%, PHS risk, DCD, HCV+, HIV+ for HIV+ recipients and enbloc/dual), financial aspects, surgical procedures, dietary instruction pre- and post-transplant, health maintenance pre- and post-transplant, post-transplant hospitalization and outpatient follow-up, potential to participate in a research protocol, and medication management and side effects.  A question and answer session was provided after the presentation.    The patient was seen by all members of the multi-disciplinary team to include: Nephrologist/CULLEN, Surgeon, , Transplant Coordinator, , Pharmacist and Dietician (if applicable).    The patient reviewed and signed all consents for evaluation which were witnessed and sent to scanning into the [facility] chart.    The patient was given an education book and plan for further evaluation based on her individual assessment.      The Patient was educated on OPTN policy change regarding race based eGFR. For Black or  Americans, this eGFR could have shown that their kidneys were working better than they were.    Because of this change, we are looking at everyones record and assessing waiting time for people who are eligible. We will be reviewing your medical records and will notify you if you are eligible. We also encouraged patient to provide span 20 labs that are not in our electronic medical  records    Reviewed program requirement for complete COVID vaccination with documentation prior to listing.  COVID education information reviewed with patient. Patient encouraged to be up to date on all vaccinations.     The patient was informed that the transplant team would manage immediate post op pain. If the patient requires long term pain management, they will need to have that pain management addressed by their PCP or previous provider who wrote for long term pain medicines.    The patient was encouraged to call with any questions or concerns.

## 2024-06-20 NOTE — PROGRESS NOTES
TRANSPLANT NUTRITIONAL ASSESSMENT    Referring Provider: Vance Tang MD     Reason for Visit: Pre-kidney transplant work-up (pre-dialysis)    Age: 58 y.o.  Sex: female    Patient Active Problem List   Diagnosis    HTN (hypertension)    DM (diabetes mellitus)    Lipoma of back    Pre-transplant evaluation for kidney transplant    Controlled type 2 diabetes mellitus without complication, without long-term current use of insulin    Acute gout involving toe of right foot    Seizure disorder    Secondary hyperparathyroidism of renal origin    CKD (chronic kidney disease), stage V     Past Medical History:   Diagnosis Date    Carotid artery stenosis     CVA (cerebral vascular accident)     Diabetes mellitus, type 2     Disorder of kidney and ureter     DM (diabetes mellitus) 02/02/2015    HTN (hypertension) 02/02/2015    Hyperparathyroidism, unspecified     Normocytic anemia      Lab Results   Component Value Date     (H) 06/20/2024    K 3.8 06/20/2024    K 4.5 10/18/2019    PHOS 5.5 (H) 06/20/2024    CHOL 153 06/20/2024    HDL 76 (H) 06/20/2024    TRIG 82 06/20/2024    ALBUMIN 3.7 06/20/2024    ALBUMIN 4.3 10/18/2019    HGBA1C 5.5 06/20/2024    CALCIUM 8.6 (L) 06/20/2024    CALCIUM 9.5 10/18/2019     Other Pertinent Labs: N/A    Current Outpatient Medications   Medication Sig    acetaminophen (TYLENOL) 650 MG TbSR Take 650 mg by mouth every 8 (eight) hours as needed.    atorvastatin (LIPITOR) 40 MG tablet Take 40 mg by mouth once daily.    carvediloL (COREG) 25 MG tablet Take 25 mg by mouth 2 (two) times daily.    fluticasone propionate (FLONASE) 50 mcg/actuation nasal spray 1 spray by Each Nostril route daily as needed for Allergies or Rhinitis.    levETIRAcetam (KEPPRA) 500 MG Tab Take 500 mg by mouth 2 (two) times daily.    NIFEdipine (PROCARDIA-XL) 90 MG (OSM) 24 hr tablet Take 1 tablet (90 mg total) by mouth once daily.     No current facility-administered medications for this visit.     Allergies:  "Patient has no known allergies.    Ht Readings from Last 1 Encounters:   06/20/24 5' 2.76" (1.594 m)     Wt Readings from Last 1 Encounters:   06/20/24 82.9 kg (182 lb 12.2 oz)      BMI: Body mass index is 32.63 kg/m².    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 Pt reports consuming 2 meals/day and cooks at home often.     Diet Recall    Morning: honey nut cheerios or frosted flakes with whole milk     Midday/Evening: salad (lettuce, grilled chicken or shrimp, tomato, cucumber, guacamole, cheese, crouton, Italian dressing), sandwich (turkey, lettuce, tomato, power), grilled chicken sandwich, chicken and sausage jambalaya, pork, seafood, mashed potatoes, mac and cheese, beans and rice     Snacks: cheese crackers, stephenie crackers, vanilla wafers, unsalted crackers, nuts, fruit     Beverages: 10 bottles water/day, coke zero daily, root beer occasionally       Seasonings: herbs and spices, Jv's     Restaurants/Fast Food: 2-3x/wk       Nutritional Diagnoses  Problem: food- and nutrition-related knowledge deficit  Etiology: RT lack of previous education on pre-kidney transplant nutrition recommendations  Symptoms: AEB diet recall and questions from pt    Educational Need? yes  Barriers: none identified  Discussed with: patient  Interventions: Patient taught nutrition information regarding Pre-kidney transplant work-up (pre-dialysis). Renal Nutrition Therapy packet reviewed (high/low food sources of K, Phos and protein, low sodium and fluid intake, emphasis on moderate protein intake). Encouraged physical activity " daily, regular exercise as tolerated, stay mobile.  Goals/Recommendations: diet adherence and limit intake of high phosphorus foods  Actions Taken: instruct/provide written information  Patient and/or family comprehend instructions: yes  Outcome: Verbalizes understanding  Monitoring: Contact information provided, will f/u in clinic and communicate with the care team as needed.     Counseling Time: 20 minutes

## 2024-06-26 NOTE — PROGRESS NOTES
Transplant Recipient Adult Psychosocial Assessment    Kaitlyn Hairston  [address] [place] [address]  Telephone Information:   Mobile [phone]   Home  [phone] (home)  Work  There is no work phone number on file.  E-mail  [email or web address]    Sex: female  YOB: 1965  Age: 58 y.o.    Encounter Date: 2024  [place] Citizen: yes  Primary Language: English   Needed: no    Emergency Contact:  Name: Yesy Todd  Relationship: daughter  Address: same as pt   Phone Numbers:[phone] (mobile)    Family/Social Support:   Number of dependents/: Pt reports no dependents  Marital history: Pt reports 2 previous marriages, resulting in divorce.  Other family dynamics: Pt presents with highly supportive dtr Yesy. Pt reports parents are . Pt resides with dtr Yesy and grandchildren: Halima and Felipe. Pt has 3 adult children: Yesy, Jake and eDysi. Pt reports family as highly supportive.     Household Composition:  Name: Yolanda  Age: 16 and 18  Relationship:  grandchildren   Does person drive? yes    Name: Yesy Todd  Age: 34  Relationship: daughter  Does person drive? yes    Do you and your caregivers have access to reliable transportation? yes  PRIMARY CAREGIVER: Yesy Todd will be primary caregiver, phone number [phone].      provided in-depth information to patient and caregiver regarding pre- and post-transplant caregiver role.   strongly encourages patient and caregiver to have concrete plan regarding post-transplant care giving, including back-up caregiver(s) to ensure care giving needs are met as needed.    Patient and Caregiver states understanding all aspects of caregiver role/commitment and is able/willing/committed to being caregiver to the fullest extent necessary.    Patient and Caregiver verbalizes understanding of the education provided today and caregiver responsibilities.          remains available. Patient and Caregiver agree to contact  in a timely manner if concerns arise.      Able to take time off work without financial concerns: yes.     Additional Significant Others who will Assist with Transplant:  Name: Jake Todd  Age: 39  Number: [phone]  City:  State: [place]  Relationship: son  Does person drive? yes    Name: Deysi Da Silva  Age: 27  Number: [phone]  City:  State: [place]  Relationship: daughter  Does person drive? yes    Living Will: no  Healthcare Power of : no Pt reports trusting dtr  Advance Directives on file: <<no information> per medical record.  Verbally reviewed LW/HCPA information.   provided patient with copy of LW/HCPA documents and provided education on completion of forms.    Living Donors: No. Education and resource information given to patient.    Highest Education Level: High School (9-12) or GED  Reading Ability: 12th grade  Reports difficulty with: seeing wears glasses  Learns Best By:  A combination of verbal, written, and hands-on instruction.       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    Spouse/Significant Other Employment: Pt denies    Disabled: no    Monthly Income:  Salary/Wages: $4,000  Able to afford all costs now and if transplanted, including medications: yes  Patient and Caregiver verbalizes understanding of personal responsibilities related to transplant costs and the importance of having a financial plan to ensure that patients transplant costs are fully covered.       provided fundraising information/education. Patient and Caregiververbalizes understanding.   remains available.    Insurance:   Payer/Plan Subscr  Sex Relation Sub. Ins. ID Effective Group Num   1. [facility]* SWETA SMYTHLIN* 1965 Female Self  [id number] 12/1/23 [id number]                                   [address]   2. [facility]* VICKI SMYTH* 1965 Female Self [id number] 10/20/23                                    [address] [address]     Primary Insurance (for UNOS reporting): Private Insurance  Secondary Insurance (for UNOS reporting): None  Patient and Caregiver verbalizes clear understanding that patient may experience difficulty obtaining and/or be denied insurance coverage post-surgery. This includes and is not limited to disability insurance, life insurance, health insurance, burial insurance, long term care insurance, and other insurances.      Patient and Caregiver also reports understanding that future health concerns related to or unrelated to transplantation may not be covered by patient's insurance.  Resources and information provided and reviewed.     Patient and Caregiver provides verbal permission to release any necessary information to outside resources for patient care and discharge planning.  Resources and information provided are reviewed.      Dialysis Adherence: Patient reports as pre-dialysis. Pt reports she has not met with a nephrologist in over a year. Pt denies having any ESRD symptoms and could not recall last GFR. SW strongly ENCOURAGED pt to schedule follow up with her nephrologist.      Infusion Service: patient utilizing? no  Home Health: patient utilizing? no  DME: no  Pulmonary/Cardiac Rehab: pt denies   ADLS:  Pt reports pt is independent with all ADLS including driving, bathing,walking,taking medications, cooking, housekeeping, eating, and shopping.      Adherence:  Adherence education and counseling provided.     Per History Section:  Past Medical History:   Diagnosis Date    Carotid artery stenosis     CVA (cerebral vascular accident)     Diabetes mellitus, type 2     Disorder of kidney and ureter     DM (diabetes mellitus) 02/02/2015    HTN (hypertension) 02/02/2015    Hyperparathyroidism, unspecified      Normocytic anemia      Social History     Tobacco Use    Smoking status: Never    Smokeless tobacco: Never   Substance Use Topics    Alcohol use: No     Social History     Substance and Sexual Activity   Drug Use No     Social History     Substance and Sexual Activity   Sexual Activity Yes    Partners: Male    Birth control/protection: None       Per Today's Psychosocial:  Tobacco: none, patient denies any use.  Alcohol: none, patient denies any use.  Illicit Drugs/Non-prescribed Medications: none, patient denies any use.    Patient and Caregiver states clear understanding of the potential impact of substance use as it relates to transplant candidacy and is aware of possible random substance screening.  Substance abstinence/cessation counseling, education and resources provided and reviewed.     Arrests/DWI/Treatment/Rehab: patient denies    Psychiatric History:    Mental Health: Pt denies history of anxiety, depression and or overwhelming feelings of sadness at this time or in the past.   Psychiatrist/Counselor: Pt denies currently or in the past and reports willingness to meet with psychiatry if required by transplant.   Medications:  Pt denies utilizing mental health medications currently or in the past  Suicide/Homicide Issues: Pt denies feelings of wanting to harm self or other currently or in the past  Safety at home: Pt reports feeling safe at home.     Knowledge: Patient and Caregiver states having clear understanding and realistic expectations regarding the potential risks and potential benefits of organ transplantation and organ donation and agrees to discuss with health care team members and support system members, as well as to utilize available resources and express questions and/or concerns in order to further facilitate the pt informed decision-making.  Resources and information provided and reviewed.    Patient and Caregiver is aware of [facility]'s affiliation and/or partnership with agencies in  home health care, LTAC, SNF, DME, and other hospitals and clinics.    Understanding: Patient and Caregiver reports having a clear understanding of the many lifetime commitments involved with being a transplant recipient, including costs, compliance, medications, lab work, procedures, appointments, concrete and financial planning, preparedness, timely and appropriate communication of concerns, abstinence (ETOH, tobacco, illicit non-prescribed drugs), adherence to all health care team recommendations, support system and caregiver involvement, appropriate and timely resource utilization and follow-through, mental health counseling as needed/recommended, and patient and caregiver responsibilities.  Social Service Handbook, resources and detailed educational information provided and reviewed.  Educational information provided.    Patient and Caregiver also reports current and expected compliance with health care regime and states having a clear understanding of the importance of compliance.      Patient and Caregiver reports a clear understanding that risks and benefits may be involved with organ transplantation and with organ donation.       Patient and Caregiver also reports clear understanding that psychosocial risk factors may affect patient, and include but are not limited to feelings of depression, generalized anxiety, anxiety regarding dependence on others, post traumatic stress disorder, feelings of guilt and other emotional and/or mental concerns, and/or exacerbation of existing mental health concerns.  Detailed resources provided and discussed.      Patient and Caregiver agrees to access appropriate resources in a timely manner as needed and/or as recommended, and to communicate concerns appropriately.  Patient and Caregiver also reports a clear understanding of treatment options available.     Patient and Caregiver received education in a group setting.   reviewed education, provided additional  "information, and answered questions.    Feelings or Concerns: Pt denies having any concerns and or overwhelming feelings regarding transplant at this time.       Coping: Identify Patient & Caregiver Strategies to [place]:   1. 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 Currently & Pre-transplant - family time and catering services   3. At the time of surgery - family time   4. 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      Interview Behavior: Patient and Caregiver presents as alert and oriented x 4, pleasant, good eye contact, well groomed, recall good, concentration/judgement good, average intelligence, calm, communicative, cooperative, and asking and answering questions appropriately. Pt and caregiver were highly engaged during assessment.         Transplant Social Work - Candidacy  Assessment/Plan:     Psychosocial Suitability: Patient presents as a suitable candidate for transplant at this time. Based on psychosocial risk factors, patient presents as low risk, due to absence of overwhelming psychosocial concerns  .    Recommendations/Additional Comments: SW recommends that pt conduct fundraising to assist pt with pay for cost of medications, food, gas, and other transplant related needs. SW recommends that pt remain aware of potential mental health concerns and contact the team if any concerns arise. SW recommends that pt remain abstinent from tobacco, ETOH, and drug use. SW supports pt's continued dialysis adherence. SW remains available to answer any questions or concerns that arise as the pt moves through the transplant process.     JERRY Booth, LMSW             "

## 2024-06-27 ENCOUNTER — TELEPHONE (OUTPATIENT)
Dept: TRANSPLANT | Facility: CLINIC | Age: 59
End: 2024-06-27
Payer: COMMERCIAL

## 2024-06-27 DIAGNOSIS — Z76.82 ORGAN TRANSPLANT CANDIDATE: Primary | ICD-10-CM

## 2024-07-01 ENCOUNTER — HOSPITAL ENCOUNTER (OUTPATIENT)
Dept: PREADMISSION TESTING | Facility: HOSPITAL | Age: 59
Discharge: HOME | End: 2024-07-01
Attending: STUDENT IN AN ORGANIZED HEALTH CARE EDUCATION/TRAINING PROGRAM
Payer: COMMERCIAL

## 2024-07-01 DIAGNOSIS — Z76.82 ORGAN TRANSPLANT CANDIDATE: ICD-10-CM

## 2024-07-03 ENCOUNTER — TELEPHONE (OUTPATIENT)
Dept: CARDIOLOGY | Facility: HOSPITAL | Age: 59
End: 2024-07-03
Payer: COMMERCIAL

## 2024-07-04 PROBLEM — Z01.810 PREOPERATIVE CARDIOVASCULAR EXAMINATION: Status: ACTIVE | Noted: 2024-07-04

## 2024-07-04 PROBLEM — E11.69 HYPERLIPIDEMIA ASSOCIATED WITH TYPE 2 DIABETES MELLITUS: Status: ACTIVE | Noted: 2024-07-04

## 2024-07-04 PROBLEM — Z86.73 HISTORY OF CEREBROVASCULAR ACCIDENT (CVA) INVOLVING CEREBELLUM: Status: ACTIVE | Noted: 2024-07-04

## 2024-07-04 PROBLEM — E78.5 HYPERLIPIDEMIA ASSOCIATED WITH TYPE 2 DIABETES MELLITUS: Status: ACTIVE | Noted: 2024-07-04

## 2024-07-05 ENCOUNTER — HOSPITAL ENCOUNTER (OUTPATIENT)
Dept: PREADMISSION TESTING | Facility: HOSPITAL | Age: 59
Discharge: HOME | End: 2024-07-05
Attending: COLON & RECTAL SURGERY
Payer: COMMERCIAL

## 2024-07-05 ENCOUNTER — TELEPHONE (OUTPATIENT)
Dept: CARDIOLOGY | Facility: CLINIC | Age: 59
End: 2024-07-05
Payer: COMMERCIAL

## 2024-07-05 DIAGNOSIS — Z01.818 PRE-TRANSPLANT EVALUATION FOR KIDNEY TRANSPLANT: Primary | ICD-10-CM

## 2024-07-05 DIAGNOSIS — R00.2 PALPITATIONS: Primary | ICD-10-CM

## 2024-07-05 RX ORDER — POLYETHYLENE GLYCOL 3350, SODIUM CHLORIDE, SODIUM BICARBONATE, POTASSIUM CHLORIDE 420; 11.2; 5.72; 1.48 G/4L; G/4L; G/4L; G/4L
1 POWDER, FOR SOLUTION ORAL ONCE
Qty: 4000 ML | Refills: 0 | Status: SHIPPED | OUTPATIENT
Start: 2024-07-05 | End: 2024-07-05

## 2024-07-18 DIAGNOSIS — B89 UNSPECIFIED PARASITIC DISEASE: Primary | ICD-10-CM

## 2024-07-18 DIAGNOSIS — B78.9 STRONGYLOIDIASIS: ICD-10-CM

## 2024-07-18 RX ORDER — IVERMECTIN 3 MG/1
TABLET ORAL
Qty: 20 TABLET | Refills: 0 | Status: SHIPPED | OUTPATIENT
Start: 2024-07-18

## 2024-07-18 NOTE — PROGRESS NOTES
Called and spoke with Mrs. Hairston about her positive Strongyloides IgG result. Ivermectin was sent to the pharmacy. All questions answered.

## 2024-09-06 ENCOUNTER — TELEPHONE (OUTPATIENT)
Dept: CARDIOLOGY | Facility: HOSPITAL | Age: 59
End: 2024-09-06
Payer: COMMERCIAL

## 2024-11-08 ENCOUNTER — TELEPHONE (OUTPATIENT)
Dept: PREADMISSION TESTING | Facility: HOSPITAL | Age: 59
End: 2024-11-08
Payer: COMMERCIAL

## 2024-11-08 NOTE — TELEPHONE ENCOUNTER
Called patient to get update on cardiology visit for pre op clearance to proceed with scheduling colonoscopy. No answer and no voicemail option to leave message.

## 2024-11-15 ENCOUNTER — TELEPHONE (OUTPATIENT)
Dept: PREADMISSION TESTING | Facility: HOSPITAL | Age: 59
End: 2024-11-15
Payer: COMMERCIAL

## 2024-11-15 NOTE — TELEPHONE ENCOUNTER
This patient had a screening colonoscopy referral placed 6/27/24 by Sakshi Wadsworth NP. However, the patient needs to see cardiology for testing and clearance first and we have tried to reach her several times to notify her. Notified Sakshi Wadsworth NP to please place a new referral or let us know if she gets in touch with the patient and she schedules with cardiology and is ready to schedule a colonoscopy.      10/24/2024 08:21 AM CDT by Lenore Gutierrez RN  Outgoing Kaitlyn Hairston (Self) [phone] (Home)    Left Message - EF 35% resulted from recent echo, outside cards, needs to be rescheduled until after cardiac clearance. No answer, LVM both numbers. No MC   10/28/2024 11:49 AM CDT by Lenore Gutierrez RN  Outgoing Kaitlyn Hairston (Self) N/A       10/28/2024 03:05 PM CDT by Virginie Mcgregor RN  Outgoing Kaitlyn Hairston (Self) [phone] (Home)    Left Message - colonsocopy needs to be cx until after clear by card hx ef 35 and no show to card appt's and testing. no ans. left vm. no mc.   10/28/2024 03:07 PM CDT by Virginie Mcgregor RN  Outgoing Kaitlyn Hairston (Self) [phone] (Mobile)    Not Available - vm not setup yet   10/29/2024 08:27 AM CDT by Lenore Gutierrez RN  Outgoing Kaitlyn Hairston (Self) [phone] (Mobile)    No Answer/Busy - No answer, No VM, No MC   10/30/2024 09:06 AM CDT by Tonie Lepe LPN  Outgoing Kaitlyn Hairston (Self) [phone] (Home)    Left Message - LM that procedure needs to be canceled due to missed cards clearance appt, will need to reschedule.   10/31/2024 01:45 PM CDT by Lenore Gutierrez RN  Outgoing Kaitlyn Hairston (Self) [phone] (Home)    Left Message - LVM informing pt colonoscopy cancelled,   Sarah Christopher, GINA       11/8/24 10:56 AM  Note  Called patient to get update on cardiology visit for pre op clearance to proceed with scheduling colonoscopy. No answer and no voicemail option to leave message.

## 2024-11-18 ENCOUNTER — TELEPHONE (OUTPATIENT)
Dept: TRANSPLANT | Facility: CLINIC | Age: 59
End: 2024-11-18
Payer: COMMERCIAL

## 2024-11-18 NOTE — TELEPHONE ENCOUNTER
Added to 11/22/24  Selection Committee list for medical discussion as requested by Sakshi.  ----- Message from Sakshi Wadsworth NP sent at 11/15/2024  2:50 PM CST -----  Regarding: FW: need cardiac clearance for colonoscopy referral  I just got this message regarding a eval patient that has been in work up for almost 6 months. She has no showed/missed several of our scheduled visits and tests for work up. I also see that she was hospitalized in September for seizures and stroke. Would bring up for discussion-can likely close and come back once she has everything completed  ----- Message -----  From: Virginie Mcgregor RN  Sent: 11/15/2024  11:50 AM CST  To: Sakshi Wadsworth NP  Subject: need cardiac clearance for colonoscopy refer#    Sakshi Wadsworth NP,  This patient had a screening colonoscopy referral placed 6/27/24. However, the patient needs to see cardiology for testing and clearance first and we have tried to reach her several times to notify her. Please place a new referral or let us know if you get in touch with the patient and she schedules with cardiology and is ready to schedule a colonoscopy.  Feel free to reach out with any questions.  Thank you,   Virginie Mcgregor RN  Endoscopy Scheduling/Pre-admit Department

## 2024-11-25 ENCOUNTER — COMMITTEE REVIEW (OUTPATIENT)
Dept: TRANSPLANT | Facility: CLINIC | Age: 59
End: 2024-11-25
Payer: COMMERCIAL

## 2024-11-25 NOTE — LETTER
November 25, 2024    Kaitlyn Hairston  [address] [place] [address]    Dear Kaitlynleticia Hairston:  MRN: [id number]    It is the duty of the [facility] Selection Committee to determine which patients are candidates for a transplant. For this reason, our committee has the difficult task of evaluating patients to determine which ones have the greatest chance of having a successful transplant. We are aware of the magnitude of this responsibility, and we approach it with reverence and humility.    It is with regret I inform you that you are not approved as a transplant candidate due to  recent hospitalization for seizures/stroke.  May be re-referred after six months seizure-free, with colonoscopy.  Will need cardiac clearance for colonoscopy.  Based on this review, we have determined that at this time, you are not a candidate for a transplant at [facility].      The selection committee carefully considers each patient's transplant candidacy and determines whether it is safe to proceed with transplantation on a case-by-case basis using established selection criteria.  At present, the risk of proceeding with an elective transplant surgery has become too high.                                                                               Although the selection committee believes you are not a suitable transplant candidate, you have the option to be evaluated at other transplant centers who may have different selection criteria.  You may request your [facility] records be sent to any center of your choice by contacting our Medical Records Department at [phone].                                                                               Attached is a letter from the [employer] for Organ Sharing (UNOS).  It describes the services and information offered to patients by UNOS and the Organ Procurement and Transplant Network.    The [facility] sincerely wishes you the best  and remains available to answer any questions.  Please do not hesitate to contact our pre-transplant office if we can assist you in any other way.                                                                               Sincerely,      Kenya Carvalho MD  Medical Director, Kidney & Kidney/Pancreas Transplantation    Cc: SHAHLA Franco    Encl: UNOS Letter               The Organ Procurement and Transplantation Network   Toll-free patient services line: [phone]  Your resource for organ transplant information      Staffed 8:30 am - 5:00 pm ET Monday - Friday   Leave a message 24/7 to receive a call back    The Organ Procurement and Transplantation Network (OPTN) is the national transplant system. It makes the policies that decide how donated organs are matched to patients waiting for a transplant. The OPTN:    Makes sure donated organs get matched to people on the transplant waiting list  Tells people about the donation and transplant processes  Makes sure that the public knows about the need for more organ and tissue donations    The OPTN has a free patient services line that you can call to:  Get more information about:   o Organ donation and organ transplants   o Donation and transplant policies  Get an information kit with:   o A list of transplant hospitals   o Waiting list information  Talk about any questions you may have about your transplant hospital or organ procurement organization. The staff will do their best to help you or point you to others who may help.  Find out how you can volunteer with the OPTN and help shape transplant policy    The patient services line number is: 0-289-722-8390    Patient services line staff CANNOT answer questions about your own medical care, including:  Waiting list status  Test results  Medical records  You will need to call your transplant hospital for this information.    The following websites have more information about transplantation  and donation:  OPTN: https://optn.transplant.hrsa.gov/  For potential living donors and transplant recipients:   o Living with transplant: https://www.transplantliving.org/   o Living donation process: https://optn.transplant.hrsa.gov/living-donation/     o Financial assistance: https://www.livingdonorassistance.org/  Transplantation data: https://www.srtr.org/  Organ donation: https://www.organdonor.gov/    Volunteer with the OPTN: https://optn.transplant.hrsa.gov/get-involved

## 2024-11-25 NOTE — COMMITTEE REVIEW
Native Organ Dx: Diabetes Mellitus - Type II    Kaitlyn Hairston was presented to the Selection Committee on 11/22/24.    Not approved for LRD/CAD transplant due to recent hospitalization for seizures/stroke.  May be re-referred after six months seizure-free, with colonoscopy.  Will need cardiac clearance for colonoscopy.      Note written by Sara Croft RN    ===============================================    I was present at the meeting and attest to the decision of the committee.    Gold Chavarria  11/25/2024